# Patient Record
Sex: MALE | Race: OTHER | NOT HISPANIC OR LATINO | URBAN - METROPOLITAN AREA
[De-identification: names, ages, dates, MRNs, and addresses within clinical notes are randomized per-mention and may not be internally consistent; named-entity substitution may affect disease eponyms.]

---

## 2023-05-26 ENCOUNTER — INPATIENT (INPATIENT)
Facility: HOSPITAL | Age: 33
LOS: 2 days | Discharge: HOME | DRG: 871 | End: 2023-05-29
Attending: SPECIALIST | Admitting: SPECIALIST
Payer: COMMERCIAL

## 2023-05-26 VITALS
HEART RATE: 105 BPM | TEMPERATURE: 98 F | OXYGEN SATURATION: 94 % | SYSTOLIC BLOOD PRESSURE: 114 MMHG | DIASTOLIC BLOOD PRESSURE: 81 MMHG | HEIGHT: 67 IN | WEIGHT: 130.95 LBS | RESPIRATION RATE: 20 BRPM

## 2023-05-26 DIAGNOSIS — R06.09 OTHER FORMS OF DYSPNEA: ICD-10-CM

## 2023-05-26 DIAGNOSIS — A41.9 SEPSIS, UNSPECIFIED ORGANISM: ICD-10-CM

## 2023-05-26 DIAGNOSIS — J18.9 PNEUMONIA, UNSPECIFIED ORGANISM: ICD-10-CM

## 2023-05-26 DIAGNOSIS — Z29.9 ENCOUNTER FOR PROPHYLACTIC MEASURES, UNSPECIFIED: ICD-10-CM

## 2023-05-26 DIAGNOSIS — G35 MULTIPLE SCLEROSIS: ICD-10-CM

## 2023-05-26 LAB
ANION GAP SERPL CALC-SCNC: 9 MMOL/L — SIGNIFICANT CHANGE UP (ref 5–17)
APTT BLD: 34.7 SEC — SIGNIFICANT CHANGE UP (ref 27.5–35.5)
BASOPHILS # BLD AUTO: 0.05 K/UL — SIGNIFICANT CHANGE UP (ref 0–0.2)
BASOPHILS NFR BLD AUTO: 0.3 % — SIGNIFICANT CHANGE UP (ref 0–2)
BUN SERPL-MCNC: 8 MG/DL — SIGNIFICANT CHANGE UP (ref 7–23)
CALCIUM SERPL-MCNC: 9 MG/DL — SIGNIFICANT CHANGE UP (ref 8.4–10.5)
CHLORIDE SERPL-SCNC: 101 MMOL/L — SIGNIFICANT CHANGE UP (ref 96–108)
CK MB CFR SERPL CALC: <1 NG/ML — SIGNIFICANT CHANGE UP (ref 0–6.7)
CK SERPL-CCNC: 26 U/L — LOW (ref 30–200)
CO2 SERPL-SCNC: 28 MMOL/L — SIGNIFICANT CHANGE UP (ref 22–31)
CREAT SERPL-MCNC: 0.72 MG/DL — SIGNIFICANT CHANGE UP (ref 0.5–1.3)
EGFR: 122 ML/MIN/1.73M2 — SIGNIFICANT CHANGE UP
EOSINOPHIL # BLD AUTO: 0.31 K/UL — SIGNIFICANT CHANGE UP (ref 0–0.5)
EOSINOPHIL NFR BLD AUTO: 1.7 % — SIGNIFICANT CHANGE UP (ref 0–6)
GLUCOSE SERPL-MCNC: 109 MG/DL — HIGH (ref 70–99)
HCT VFR BLD CALC: 37 % — LOW (ref 39–50)
HGB BLD-MCNC: 11.5 G/DL — LOW (ref 13–17)
IMM GRANULOCYTES NFR BLD AUTO: 0.8 % — SIGNIFICANT CHANGE UP (ref 0–0.9)
INR BLD: 1.23 — HIGH (ref 0.88–1.16)
LACTATE SERPL-SCNC: 1 MMOL/L — SIGNIFICANT CHANGE UP (ref 0.5–2)
LYMPHOCYTES # BLD AUTO: 1.18 K/UL — SIGNIFICANT CHANGE UP (ref 1–3.3)
LYMPHOCYTES # BLD AUTO: 6.5 % — LOW (ref 13–44)
MCHC RBC-ENTMCNC: 25.6 PG — LOW (ref 27–34)
MCHC RBC-ENTMCNC: 31.1 GM/DL — LOW (ref 32–36)
MCV RBC AUTO: 82.2 FL — SIGNIFICANT CHANGE UP (ref 80–100)
MONOCYTES # BLD AUTO: 1.1 K/UL — HIGH (ref 0–0.9)
MONOCYTES NFR BLD AUTO: 6 % — SIGNIFICANT CHANGE UP (ref 2–14)
NEUTROPHILS # BLD AUTO: 15.42 K/UL — HIGH (ref 1.8–7.4)
NEUTROPHILS NFR BLD AUTO: 84.7 % — HIGH (ref 43–77)
NRBC # BLD: 0 /100 WBCS — SIGNIFICANT CHANGE UP (ref 0–0)
PLATELET # BLD AUTO: 573 K/UL — HIGH (ref 150–400)
POTASSIUM SERPL-MCNC: 4.3 MMOL/L — SIGNIFICANT CHANGE UP (ref 3.5–5.3)
POTASSIUM SERPL-SCNC: 4.3 MMOL/L — SIGNIFICANT CHANGE UP (ref 3.5–5.3)
PROTHROM AB SERPL-ACNC: 14.7 SEC — HIGH (ref 10.5–13.4)
RAPID RVP RESULT: SIGNIFICANT CHANGE UP
RBC # BLD: 4.5 M/UL — SIGNIFICANT CHANGE UP (ref 4.2–5.8)
RBC # FLD: 12.5 % — SIGNIFICANT CHANGE UP (ref 10.3–14.5)
SARS-COV-2 RNA SPEC QL NAA+PROBE: SIGNIFICANT CHANGE UP
SODIUM SERPL-SCNC: 138 MMOL/L — SIGNIFICANT CHANGE UP (ref 135–145)
TROPONIN T SERPL-MCNC: 0.01 NG/ML — SIGNIFICANT CHANGE UP (ref 0–0.01)
WBC # BLD: 18.21 K/UL — HIGH (ref 3.8–10.5)
WBC # FLD AUTO: 18.21 K/UL — HIGH (ref 3.8–10.5)

## 2023-05-26 PROCEDURE — 71275 CT ANGIOGRAPHY CHEST: CPT | Mod: 26

## 2023-05-26 PROCEDURE — 99285 EMERGENCY DEPT VISIT HI MDM: CPT

## 2023-05-26 PROCEDURE — 71045 X-RAY EXAM CHEST 1 VIEW: CPT | Mod: 26

## 2023-05-26 RX ORDER — CEFTRIAXONE 500 MG/1
1000 INJECTION, POWDER, FOR SOLUTION INTRAMUSCULAR; INTRAVENOUS EVERY 24 HOURS
Refills: 0 | Status: DISCONTINUED | OUTPATIENT
Start: 2023-05-26 | End: 2023-05-29

## 2023-05-26 RX ORDER — KETOROLAC TROMETHAMINE 30 MG/ML
15 SYRINGE (ML) INJECTION ONCE
Refills: 0 | Status: DISCONTINUED | OUTPATIENT
Start: 2023-05-26 | End: 2023-05-26

## 2023-05-26 RX ORDER — CEFTRIAXONE 500 MG/1
1000 INJECTION, POWDER, FOR SOLUTION INTRAMUSCULAR; INTRAVENOUS ONCE
Refills: 0 | Status: COMPLETED | OUTPATIENT
Start: 2023-05-26 | End: 2023-05-26

## 2023-05-26 RX ORDER — MODAFINIL 200 MG/1
100 TABLET ORAL DAILY
Refills: 0 | Status: DISCONTINUED | OUTPATIENT
Start: 2023-05-27 | End: 2023-05-29

## 2023-05-26 RX ORDER — IPRATROPIUM/ALBUTEROL SULFATE 18-103MCG
3 AEROSOL WITH ADAPTER (GRAM) INHALATION
Refills: 0 | Status: COMPLETED | OUTPATIENT
Start: 2023-05-26 | End: 2023-05-26

## 2023-05-26 RX ORDER — ACETAMINOPHEN 500 MG
650 TABLET ORAL EVERY 6 HOURS
Refills: 0 | Status: DISCONTINUED | OUTPATIENT
Start: 2023-05-26 | End: 2023-05-29

## 2023-05-26 RX ORDER — BACLOFEN 100 %
20 POWDER (GRAM) MISCELLANEOUS EVERY 12 HOURS
Refills: 0 | Status: DISCONTINUED | OUTPATIENT
Start: 2023-05-26 | End: 2023-05-28

## 2023-05-26 RX ORDER — SODIUM CHLORIDE 9 MG/ML
1000 INJECTION INTRAMUSCULAR; INTRAVENOUS; SUBCUTANEOUS ONCE
Refills: 0 | Status: COMPLETED | OUTPATIENT
Start: 2023-05-26 | End: 2023-05-26

## 2023-05-26 RX ORDER — BACLOFEN 100 %
1 POWDER (GRAM) MISCELLANEOUS
Refills: 0 | DISCHARGE

## 2023-05-26 RX ORDER — SODIUM CHLORIDE 9 MG/ML
1000 INJECTION INTRAMUSCULAR; INTRAVENOUS; SUBCUTANEOUS
Refills: 0 | Status: DISCONTINUED | OUTPATIENT
Start: 2023-05-26 | End: 2023-05-27

## 2023-05-26 RX ORDER — AZITHROMYCIN 500 MG/1
500 TABLET, FILM COATED ORAL ONCE
Refills: 0 | Status: COMPLETED | OUTPATIENT
Start: 2023-05-26 | End: 2023-05-26

## 2023-05-26 RX ORDER — DALFAMPRIDINE 10 MG/1
1 TABLET, FILM COATED, EXTENDED RELEASE ORAL
Refills: 0 | DISCHARGE

## 2023-05-26 RX ORDER — AZITHROMYCIN 500 MG/1
500 TABLET, FILM COATED ORAL EVERY 24 HOURS
Refills: 0 | Status: COMPLETED | OUTPATIENT
Start: 2023-05-27 | End: 2023-05-27

## 2023-05-26 RX ORDER — MODAFINIL 200 MG/1
1 TABLET ORAL
Refills: 0 | DISCHARGE

## 2023-05-26 RX ORDER — LANOLIN ALCOHOL/MO/W.PET/CERES
3 CREAM (GRAM) TOPICAL AT BEDTIME
Refills: 0 | Status: DISCONTINUED | OUTPATIENT
Start: 2023-05-26 | End: 2023-05-29

## 2023-05-26 RX ADMIN — AZITHROMYCIN 255 MILLIGRAM(S): 500 TABLET, FILM COATED ORAL at 18:21

## 2023-05-26 RX ADMIN — Medication 15 MILLIGRAM(S): at 15:13

## 2023-05-26 RX ADMIN — Medication 3 MILLILITER(S): at 16:43

## 2023-05-26 RX ADMIN — SODIUM CHLORIDE 1000 MILLILITER(S): 9 INJECTION INTRAMUSCULAR; INTRAVENOUS; SUBCUTANEOUS at 15:43

## 2023-05-26 RX ADMIN — Medication 3 MILLIGRAM(S): at 23:02

## 2023-05-26 RX ADMIN — Medication 3 MILLILITER(S): at 15:43

## 2023-05-26 RX ADMIN — CEFTRIAXONE 100 MILLIGRAM(S): 500 INJECTION, POWDER, FOR SOLUTION INTRAMUSCULAR; INTRAVENOUS at 22:06

## 2023-05-26 RX ADMIN — CEFTRIAXONE 100 MILLIGRAM(S): 500 INJECTION, POWDER, FOR SOLUTION INTRAMUSCULAR; INTRAVENOUS at 16:42

## 2023-05-26 RX ADMIN — Medication 3 MILLILITER(S): at 15:13

## 2023-05-26 RX ADMIN — CEFTRIAXONE 1000 MILLIGRAM(S): 500 INJECTION, POWDER, FOR SOLUTION INTRAMUSCULAR; INTRAVENOUS at 18:20

## 2023-05-26 RX ADMIN — SODIUM CHLORIDE 80 MILLILITER(S): 9 INJECTION INTRAMUSCULAR; INTRAVENOUS; SUBCUTANEOUS at 23:03

## 2023-05-26 NOTE — H&P ADULT - ASSESSMENT
33-year-old male with PMH of MS on Ocrevus, chronic sinusitis (s/p sinus surgery 5 weeks ago) presenting with CC of productive cough, weight loss, and chest pain for 1.5 weeks, admitted for sepsis 2/2 pneumonia vs r/o PE vs TB.

## 2023-05-26 NOTE — H&P ADULT - SOCIAL HISTORY: SUBSTANCE USE
States he used to smoke marijuana but has not smoked in 7 weeks. Pt has been using adviar prescribed to him by his ENT. States he used to smoke marijuana but has not smoked in 7 weeks.

## 2023-05-26 NOTE — H&P ADULT - PROBLEM SELECTOR PLAN 3
CXR: Right upper and possibly lateral segment right middle lobe pneumonia with ipsilateral hilar lymphadenopathy. Atypical etiologies such as primary TB cannot be excluded in the appropriate clinical setting. CXR: Right upper and possibly lateral segment right middle lobe pneumonia with ipsilateral hilar lymphadenopathy. Atypical etiologies such as primary TB cannot be excluded in the appropriate clinical setting.  - Cover with abx as above  - f/u ID recs  - If decompensating consider fungal and atypical etiologies

## 2023-05-26 NOTE — ED PROVIDER NOTE - CLINICAL SUMMARY MEDICAL DECISION MAKING FREE TEXT BOX
33-year-old male with past medical history of MS on Ocrevus,  chronic sinusitis  status post sinus surgery 5 weeks ago complaining of productive cough, chest pain and back pain x1.5 weeks.  Associated with shortness of breath.  Patient states a few days after his surgery he started feeling fever, sweats, weight loss, headache, generalized weakness. Seen at pulmonology today and desatted to 93% with . Currently pt tachy to 105, O2 sat 94%. Speaking in full sentences. Lungs without wheezing. EKG sinus tachy @ 104. 33-year-old male with past medical history of MS on Ocrevus,  chronic sinusitis  status post sinus surgery 5 weeks ago complaining of productive cough, chest pain and back pain x1.5 weeks.  Associated with shortness of breath.  Patient states a few days after his surgery he started feeling fever, sweats, weight loss, headache, generalized weakness. Seen at pulmonology today and desatted to 93% with . Currently pt tachy to 105, O2 sat 94%. Speaking in full sentences. Lungs without wheezing. EKG sinus tachy @ 104. r/o pna r/o pe r/o TB

## 2023-05-26 NOTE — H&P ADULT - PROBLEM SELECTOR PLAN 1
Patient meeting 2/4 sepsis criteria on admission source likely pneumonia seen on CXR.   - f/u BCx  - Patient meeting 2/4 sepsis criteria on admission source likely pneumonia seen on CXR. s/p 1L NS in ED.   - f/u BCx  - CTX 2g for HAP x 7 days and azithromycin 500mg x 3 days.  - Fluconazole  - ID consulted f/u recs. Patient meeting 2/4 sepsis criteria on admission source likely pneumonia seen on CXR. s/p 1L NS in ED.   - f/u BCx  - CTX 2g for HAP x 7 days and azithromycin 500mg x 3 days.  - f/u legionella, mycoplasma  - ID consulted f/u recs.  - c/w IV hydration NS @80cc/hr  - Trend white count

## 2023-05-26 NOTE — H&P ADULT - HISTORY OF PRESENT ILLNESS
33-year-old male with PMH of MS on Ocrevus,  chronic sinusitis (s/p sinus surgery 5 weeks ago) prsenting with CC of productive cough and chest pain for 1.5 weeks. After the surgery, he started feeling fever, sweats, weight loss, headache, and generalized weakness x 2 weeks as well as a weight loss of 20lbs. He went to his PCP ___ who said it was likely flu. Since then, the weakness and fatigue had improved, but as of 1.5 weeks ago he started having insidious onset back and chest pain as well as dyspnea and productive cough. He went to see Dr. Fonseca today, where in the office he desaturated to 93% with  while ambulating. ICS was 40% of predicted and he had poor air movement.     ROS: Pt denies fever, chills, hemoptysis, palpitations, headache, dizziness, lower extremity swelling, recent travel.      ED Course: T 97.7  /81 RR20 Sat 94% on RA.  Labs: WBC 18.2 Platelets 573   RVP -, COVID -  CXR: Right upper and possibly lateral segment right middle lobe pneumonia with ipsilateral hilar lymphadenopathy. Atypical etiologies such as primary TB cannot be excluded in the appropriate clinical setting.   33-year-old male with PMH of MS on Ocrevus,  chronic sinusitis (s/p sinus surgery 5 weeks ago) prsenting with CC of productive cough and chest pain for 1.5 weeks. After the surgery, he started feeling fever, sweats, weight loss, headache, and generalized weakness x 2 weeks as well as a weight loss of 20lbs. He went to his PCP  who said it was likely flu. Since then, the weakness and fatigue had improved, but as of 1.5 weeks ago he started having insidious onset back and chest pain as well as dyspnea and productive cough. He went to see Dr. Fonseca today, where in the office he desaturated to 93% with  while ambulating. ICS was 40% of predicted and he had poor air movement.     ROS: Pt denies fever, chills, hemoptysis, palpitations, headache, dizziness, lower extremity swelling, recent travel.      ED Course: T 97.7  /81 RR20 Sat 94% on RA.  Labs: WBC 18.2 Platelets 573   RVP -, COVID -  CXR: Right upper and possibly lateral segment right middle lobe pneumonia with ipsilateral hilar lymphadenopathy. Atypical etiologies such as primary TB cannot be excluded in the appropriate clinical setting.    Interventions: 1LNS, 1x azithromycin, toradol.

## 2023-05-26 NOTE — H&P ADULT - PROBLEM SELECTOR PLAN 4
Home medication: Ocrevus Home medication:   - c/w ampyra (gf will bring in verify with pharmacy)  - baclofen 20mg BID  - c/w biotin 13213gd qd  - c/w vitamin D3 [id number] (next dose 5/28)  - modinifil 10mg BID PRN.

## 2023-05-26 NOTE — ED PROVIDER NOTE - PHYSICAL EXAMINATION
CONSTITUTIONAL: Well-appearing;  in no apparent distress.   HEAD: Normocephalic; atraumatic.   EYES: PERRL; EOM intact; conjunctiva and sclera clear  ENT: normal nose; no rhinorrhea; normal pharynx with no erythema or lesions.   NECK: Supple; non-tender; no LAD  CARDIOVASCULAR: Normal S1, S2; No audible murmurs. Regular rate and rhythm.   RESPIRATORY: cta b/l   GI: Soft; non-distended; non-tender; no palpable organomegaly.   MSK: FROM at all extremities, normal tone   EXT: No cyanosis or edema; N/V intact  SKIN: Normal for age and race; warm; dry; good turgor; no apparent lesions or rash.   NEURO: A & O x 3; face symmetric; grossly unremarkable.   PSYCHOLOGICAL: The patient’s mood and manner are appropriate.

## 2023-05-26 NOTE — ED ADULT TRIAGE NOTE - CHIEF COMPLAINT QUOTE
32 y/o male sent by PMD for evaluation of SOB for 5 weeks, cough, low oxygen saturation, night sweats and 20 Lb weight loss. Pt with hx of MS and recent sinus surgery.

## 2023-05-26 NOTE — H&P ADULT - NSHPLABSRESULTS_GEN_ALL_CORE
LABS:                         11.5   18.21 )-----------( 573      ( 26 May 2023 14:47 )             37.0     05-26    138  |  101  |  8   ----------------------------<  109<H>  4.3   |  28  |  0.72    Ca    9.0      26 May 2023 14:47      PT/INR - ( 26 May 2023 14:47 )   PT: 14.7 sec;   INR: 1.23          PTT - ( 26 May 2023 14:47 )  PTT:34.7 sec    CARDIAC MARKERS ( 26 May 2023 14:47 )  x     / 0.01 ng/mL / 26 U/L / x     / <1.0 ng/mL        Lactate, Blood: 1.0 mmol/L (05-26 @ 15:55)      RADIOLOGY, EKG & ADDITIONAL TESTS:

## 2023-05-26 NOTE — H&P ADULT - NSHPPHYSICALEXAM_GEN_ALL_CORE
PHYSICAL EXAM:  Constitutional: NAD, comfortable in bed.  HEENT: NC/AT, PERRLA, EOMI, no conjunctival pallor or scleral icterus, MMM  Neck: Supple, no JVD  Respiratory: CTA B/L. No w/r/r.   Cardiovascular: RRR, normal S1 and S2, no m/r/g.   Gastrointestinal: +BS, soft NTND, no guarding or rebound tenderness, no palpable masses   Extremities: wwp; no cyanosis, clubbing or edema.   Vascular: Pulses equal and strong throughout.   Neurological: AAOx3, no CN deficits, strength and sensation intact throughout.   Skin: No gross skin abnormalities or rashes

## 2023-05-26 NOTE — H&P ADULT - PROBLEM SELECTOR PLAN 2
Tachycardic to 105 on admission CXR showing middle lobe pna. Ddx including CAP vs pneumonia, PE, vs less likely TB as no hemoptysis.  - CTAP  with PE protocol, f/u results  - Quantiferon  - f/u Acid fast sputum  - f/u cx as above Tachycardic to 105 on admission CXR showing middle lobe pna. Ddx including CAP vs pneumonia, PE, vs less likely TB as no hemoptysis.  - CTAP  with PE protocol, f/u results  - If   - f/u Acid fast sputum  - f/u cx as above Tachycardic to 105 on admission CXR showing middle lobe pna. Ddx including CAP vs pneumonia, PE, vs less likely TB as no hemoptysis. EKG Sinus tachy, Trops -.   - CTAP  with PE protocol, f/u results  - f/u Acid fast sputum  - f/u cx as above

## 2023-05-26 NOTE — PATIENT PROFILE ADULT - FALL HARM RISK - UNIVERSAL INTERVENTIONS
Bed in lowest position, wheels locked, appropriate side rails in place/Call bell, personal items and telephone in reach/Instruct patient to call for assistance before getting out of bed or chair/Non-slip footwear when patient is out of bed/[place] to call system/Physically safe environment - no spills, clutter or unnecessary equipment/Purposeful Proactive Rounding/Room/bathroom lighting operational, light cord in reach

## 2023-05-26 NOTE — ED PROVIDER NOTE - ATTENDING APP SHARED VISIT CONTRIBUTION OF CARE
33-year-old male with past medical history of MS on Ocrevus,  chronic sinusitis  status post sinus surgery 5 weeks ago complaining of productive cough, chest pain and back pain x1.5 weeks.  Associated with shortness of breath.  Patient states a few days after his surgery he started feeling fever, sweats, weight loss, headache, generalized weakness.  Spoke with his doctor who said he was likely the flu.  During that time he lost 20 pounds in 2 weeks.  Since then the weakness in the fatigue improved by a week and a half ago he started feeling chest pain, cough, back pain and shortness of breath.  This sent to Dr. Fonseca today who referred him to the ER.  As per Dr. Fonseca patient desatted to 93% while walking and his heart rate went to 120.  Incentive spirometry was 40% of predicted and pt was not moving air well.  Patient currently denies fever, chills, hemoptysis, palpitations, headache, dizziness, lower extremity swelling, recent travel.  Denies smoking.  States he used to smoke marijuana but has not smoked in 7 weeks. Pt has been using adviar prescribed to him by his ENT.    desatted to 93% with . Currently pt tachy to 105, O2 sat 94%. Speaking in full sentences. Lungs without wheezing. EKG sinus tachy @ 104. r/o pna r/o pe r/o TB    Agree with above note as documented by PA.  I was available as the supervising attending during patient's ER evaluation.    Pt with fever, cough, weight loss, night sweats, ct with upper lobe pna - pt admitted for further mgmt of pna and r/o TB.

## 2023-05-26 NOTE — ED ADULT NURSE NOTE - OBJECTIVE STATEMENT
32 y/o male sent to ED by PCP c/o SOB, night sweats 34 y/o male sent to ED by PCP c/o SOB, cough night sweats, chest pressure. pt stated " he was measuring his oxygen while walking and went down from 100% to 90 % and because his hx of MS and recent sinus surgery his doctor was concerned" pt also reported loosing about 20 LB after the surgery.

## 2023-05-26 NOTE — ED PROVIDER NOTE - PROGRESS NOTE DETAILS
+multifocal pna with hilar LAD, cannot r/o TB. CT pending. Dr. Fonseca and Dr. Quan made aware of results

## 2023-05-26 NOTE — H&P ADULT - PROBLEM SELECTOR PLAN 5
F: None   E: Replete as necessary K>4 Mg>2  N: Full diet   DVT Prophylaxis: Lovenox 40mg daily   GI prophylaxis: None   CODE STATUS: FULL  DISPO: [facility] F: None   E: Replete as necessary K>4 Mg>2  N: Full diet   DVT Prophylaxis: None   GI prophylaxis: None   CODE STATUS: FULL  DISPO: [facility]

## 2023-05-26 NOTE — ED ADULT NURSE NOTE - CHIEF COMPLAINT QUOTE
34 y/o male sent by PMD for evaluation of SOB for 5 weeks, cough, low oxygen saturation, night sweats and 20 Lb weight loss. Pt with hx of MS and recent sinus surgery.

## 2023-05-26 NOTE — ED PROVIDER NOTE - OBJECTIVE STATEMENT
33-year-old male with past medical history of MS on Ocrevus,  chronic sinusitis  status post sinus surgery 5 weeks ago complaining of productive cough, chest pain and back pain x1.5 weeks.  Associated with shortness of breath.  Patient states a few days after his surgery he started feeling fever, sweats, weight loss, headache, generalized weakness.  Spoke with his doctor who said he was likely the flu.  During that time he lost 20 pounds in 2 weeks.  Since then the weakness in the fatigue improved by a week and a half ago he started feeling chest pain, cough, back pain and shortness of breath.  This sent to Dr. Fonseca today who referred him to the ER.  As per Dr. Fonseca patient desatted to 93% while walking and his heart rate went to 120.  Incentive spirometry was 40% of predicted and pt was not moving air well.  Patient currently denies fever, chills, hemoptysis, palpitations, headache, dizziness, lower extremity swelling, recent travel.  Denies smoking.  States he used to smoke marijuana but has not smoked in 7 weeks. Pt has been using adviar prescribed to him by his ENT.

## 2023-05-26 NOTE — ED ADULT NURSE NOTE - NSFALLUNIVINTERV_ED_ALL_ED
Bed/Stretcher in lowest position, wheels locked, appropriate side rails in place/Call bell, personal items and telephone in reach/Instruct patient to call for assistance before getting out of bed/chair/stretcher/Non-slip footwear applied when patient is off stretcher/[place] to call system/Physically safe environment - no spills, clutter or unnecessary equipment/Purposeful proactive rounding/Room/bathroom lighting operational, light cord in reach

## 2023-05-26 NOTE — PROGRESS NOTE ADULT - SUBJECTIVE AND OBJECTIVE BOX
33-year-old male with past medical history of MS on Ocrevus,  chronic sinusitis  status post sinus surgery 5 weeks ago complaining of productive cough, chest pain and back pain x1.5 weeks.  Referred  to Dr. Fonseca today who referred him to the ER due to desaturation of O2 on exertion and spirometry was 40% of predicted and pt was not moving air well.  Patient currently denies fever, chills, hemoptysis, palpitations, headache, dizziness, lower extremity swelling, recent travel.  Denies smoking.  Chest Xray in ED with infiltrate and wbc >18K    REVIEW OF SYSTEMS:  Constitutional: No fever, +weight loss and fatigue  ENMT:  No difficulty hearing, tinnitus, vertigo; No sinus or throat pain  Respiratory: + cough, +chills no hemoptysis  Cardiovascular: No chest pain, palpitations, dizziness or leg swelling  Gastrointestinal: No abdominal or epigastric pain. No nausea, vomiting; No diarrhea  Skin: No itching, burning, rashes or lesions   Musculoskeletal: No joint pain or swelling; No muscle, back or extremity pain    PAST MEDICAL & SURGICAL HISTORY:  Multiple sclerosis          FAMILY HISTORY:      SOCIAL HISTORY:  Smoking Status: [ ] Current, [ ] Former, [ ] Never  Pack Years:    MEDICATIONS:  MEDICATIONS  (STANDING):  azithromycin  IVPB 500 milliGRAM(s) IV Intermittent once  sodium chloride 0.9% Bolus 1000 milliLiter(s) IV Bolus once    MEDICATIONS  (PRN):      Allergies    No Known Allergies    Intolerances        Vital Signs Last 24 Hrs  T(C): 36.5 (26 May 2023 14:27), Max: 36.5 (26 May 2023 14:27)  T(F): 97.7 (26 May 2023 14:27), Max: 97.7 (26 May 2023 14:27)  HR: 105 (26 May 2023 14:27) (105 - 105)  BP: 114/81 (26 May 2023 14:27) (114/81 - 114/81)  BP(mean): --  RR: 20 (26 May 2023 14:27) (20 - 20)  SpO2: 94% (26 May 2023 14:27) (94% - 94%)    Parameters below as of 26 May 2023 14:27  Patient On (Oxygen Delivery Method): room air            PHYSICAL EXAM:    General: ; in no acute distress, thin and poorly nourished, dehydrated  HEENT: dry MM, conjunctiva and sclera clear  Lungs: no rales on current exam but likely due to dryness likely will hear crackling once hydrated  Heart: regular  Gastrointestinal: Soft, non-tender non-distended; Normal bowel sounds; No rebound or guarding  Extremities: Normal range of motion, No clubbing, cyanosis or edema  Neurological: Alert and oriented x3  Skin: Warm and dry. No obvious rash      LABS:                        11.5   18.21 )-----------( 573      ( 26 May 2023 14:47 )             37.0     05-26    138  |  101  |  8   ----------------------------<  109<H>  4.3   |  28  |  0.72    Ca    9.0      26 May 2023 14:47            RADIOLOGY & ADDITIONAL STUDIES:

## 2023-05-27 LAB
ALBUMIN SERPL ELPH-MCNC: 2.8 G/DL — LOW (ref 3.3–5)
ALP SERPL-CCNC: 114 U/L — SIGNIFICANT CHANGE UP (ref 40–120)
ALT FLD-CCNC: 44 U/L — SIGNIFICANT CHANGE UP (ref 10–45)
ANION GAP SERPL CALC-SCNC: 8 MMOL/L — SIGNIFICANT CHANGE UP (ref 5–17)
AST SERPL-CCNC: 17 U/L — SIGNIFICANT CHANGE UP (ref 10–40)
BASOPHILS # BLD AUTO: 0.05 K/UL — SIGNIFICANT CHANGE UP (ref 0–0.2)
BASOPHILS NFR BLD AUTO: 0.3 % — SIGNIFICANT CHANGE UP (ref 0–2)
BILIRUB SERPL-MCNC: 0.2 MG/DL — SIGNIFICANT CHANGE UP (ref 0.2–1.2)
BUN SERPL-MCNC: 6 MG/DL — LOW (ref 7–23)
CALCIUM SERPL-MCNC: 8.9 MG/DL — SIGNIFICANT CHANGE UP (ref 8.4–10.5)
CHLORIDE SERPL-SCNC: 106 MMOL/L — SIGNIFICANT CHANGE UP (ref 96–108)
CO2 SERPL-SCNC: 24 MMOL/L — SIGNIFICANT CHANGE UP (ref 22–31)
CREAT SERPL-MCNC: 0.72 MG/DL — SIGNIFICANT CHANGE UP (ref 0.5–1.3)
EGFR: 122 ML/MIN/1.73M2 — SIGNIFICANT CHANGE UP
EOSINOPHIL # BLD AUTO: 0.5 K/UL — SIGNIFICANT CHANGE UP (ref 0–0.5)
EOSINOPHIL NFR BLD AUTO: 3.4 % — SIGNIFICANT CHANGE UP (ref 0–6)
GLUCOSE SERPL-MCNC: 96 MG/DL — SIGNIFICANT CHANGE UP (ref 70–99)
HCT VFR BLD CALC: 33.2 % — LOW (ref 39–50)
HGB BLD-MCNC: 10.3 G/DL — LOW (ref 13–17)
IMM GRANULOCYTES NFR BLD AUTO: 1.4 % — HIGH (ref 0–0.9)
LYMPHOCYTES # BLD AUTO: 1.58 K/UL — SIGNIFICANT CHANGE UP (ref 1–3.3)
LYMPHOCYTES # BLD AUTO: 10.9 % — LOW (ref 13–44)
MAGNESIUM SERPL-MCNC: 2.1 MG/DL — SIGNIFICANT CHANGE UP (ref 1.6–2.6)
MCHC RBC-ENTMCNC: 25.7 PG — LOW (ref 27–34)
MCHC RBC-ENTMCNC: 31 GM/DL — LOW (ref 32–36)
MCV RBC AUTO: 82.8 FL — SIGNIFICANT CHANGE UP (ref 80–100)
MONOCYTES # BLD AUTO: 1.08 K/UL — HIGH (ref 0–0.9)
MONOCYTES NFR BLD AUTO: 7.4 % — SIGNIFICANT CHANGE UP (ref 2–14)
NEUTROPHILS # BLD AUTO: 11.13 K/UL — HIGH (ref 1.8–7.4)
NEUTROPHILS NFR BLD AUTO: 76.6 % — SIGNIFICANT CHANGE UP (ref 43–77)
NIGHT BLUE STAIN TISS: SIGNIFICANT CHANGE UP
NRBC # BLD: 0 /100 WBCS — SIGNIFICANT CHANGE UP (ref 0–0)
PHOSPHATE SERPL-MCNC: 4 MG/DL — SIGNIFICANT CHANGE UP (ref 2.5–4.5)
PLATELET # BLD AUTO: 501 K/UL — HIGH (ref 150–400)
POTASSIUM SERPL-MCNC: 4.4 MMOL/L — SIGNIFICANT CHANGE UP (ref 3.5–5.3)
POTASSIUM SERPL-SCNC: 4.4 MMOL/L — SIGNIFICANT CHANGE UP (ref 3.5–5.3)
PROCALCITONIN SERPL-MCNC: 0.1 NG/ML — SIGNIFICANT CHANGE UP (ref 0.02–0.1)
PROT SERPL-MCNC: 5.2 G/DL — LOW (ref 6–8.3)
RBC # BLD: 4.01 M/UL — LOW (ref 4.2–5.8)
RBC # FLD: 12.7 % — SIGNIFICANT CHANGE UP (ref 10.3–14.5)
SODIUM SERPL-SCNC: 138 MMOL/L — SIGNIFICANT CHANGE UP (ref 135–145)
SPECIMEN SOURCE: SIGNIFICANT CHANGE UP
WBC # BLD: 14.55 K/UL — HIGH (ref 3.8–10.5)
WBC # FLD AUTO: 14.55 K/UL — HIGH (ref 3.8–10.5)

## 2023-05-27 RX ORDER — ERGOCALCIFEROL 1.25 MG/1
50000 CAPSULE ORAL
Refills: 0 | Status: DISCONTINUED | OUTPATIENT
Start: 2023-05-27 | End: 2023-05-27

## 2023-05-27 RX ORDER — DALFAMPRIDINE 10 MG/1
10 TABLET, FILM COATED, EXTENDED RELEASE ORAL EVERY 12 HOURS
Refills: 0 | Status: DISCONTINUED | OUTPATIENT
Start: 2023-05-27 | End: 2023-05-29

## 2023-05-27 RX ORDER — ERGOCALCIFEROL 1.25 MG/1
50000 CAPSULE ORAL
Refills: 0 | Status: DISCONTINUED | OUTPATIENT
Start: 2023-05-27 | End: 2023-05-29

## 2023-05-27 RX ADMIN — AZITHROMYCIN 500 MILLIGRAM(S): 500 TABLET, FILM COATED ORAL at 00:15

## 2023-05-27 RX ADMIN — Medication 20 MILLIGRAM(S): at 20:46

## 2023-05-27 RX ADMIN — CEFTRIAXONE 100 MILLIGRAM(S): 500 INJECTION, POWDER, FOR SOLUTION INTRAMUSCULAR; INTRAVENOUS at 22:22

## 2023-05-27 RX ADMIN — AZITHROMYCIN 500 MILLIGRAM(S): 500 TABLET, FILM COATED ORAL at 23:30

## 2023-05-27 RX ADMIN — DALFAMPRIDINE 10 MILLIGRAM(S): 10 TABLET, FILM COATED, EXTENDED RELEASE ORAL at 20:47

## 2023-05-27 NOTE — DIETITIAN INITIAL EVALUATION ADULT - PERTINENT MEDS FT
MEDICATIONS  (STANDING):  azithromycin   Tablet 500 milliGRAM(s) Oral every 24 hours  cefTRIAXone   IVPB 1000 milliGRAM(s) IV Intermittent every 24 hours  sodium chloride 0.9%. 1000 milliLiter(s) (80 mL/Hr) IV Continuous <Continuous>    MEDICATIONS  (PRN):  acetaminophen     Tablet .. 650 milliGRAM(s) Oral every 6 hours PRN Temp greater or equal to 38C (100.4F), Mild Pain (1 - 3)  baclofen 20 milliGRAM(s) Oral every 12 hours PRN Muscle Spasm  melatonin 3 milliGRAM(s) Oral at bedtime PRN Insomnia  modafinil 100 milliGRAM(s) Oral daily PRN for anxiety

## 2023-05-27 NOTE — DIETITIAN INITIAL EVALUATION ADULT - OTHER INFO
33-year-old male with PMH of MS on Ocrevus,  chronic sinusitis (s/p sinus surgery 5 weeks ago) prsenting with CC of productive cough and chest pain for 1.5 weeks. After the surgery, he started feeling fever, sweats, weight loss, headache, and generalized weakness x 2 weeks as well as a weight loss of 20lbs. He went to his PCP  who said it was likely flu. Since then, the weakness and fatigue had improved, but as of 1.5 weeks ago he started having insidious onset back and chest pain as well as dyspnea and productive cough. He went to see Dr. Fonseca today, where in the office he desaturated to 93% with  while ambulating. ICS was 40% of predicted and he had poor air movement.     Patient seen for MST assessment. Current diet order: regular. NKFA. Pt reports he follows Kosher diet at home, however will order vegetarian food in the hospital. No difficulty chewing/swallowing reported. Reports good appetite now, however was experiencing weakness, sweats, and suspected dehydration affecting appetite PTA. Pt consumed 75 % of breakfast which included avocado toast, potatoes, hard boiled eggs, juice. Provided encouragement for PO intake and discussed benefits of ONS for preventing further weight loss, pt agreeable to KienVe. Dosing weight: 125 pounds, BMI 19.6, reports UBW of 142 pounds x3 months ago. 12% weight loss x3 months, clinically significant. No pressure injuries documented. No edema documented. Denies N/V/D, reports no BM x2 days but normally has BM 1x/day. BUN low. Meds: abx. Based on ASPEN guidelines, pt meets criteria for severe malnutrition. See nutrition recommendations below.

## 2023-05-27 NOTE — DIETITIAN INITIAL EVALUATION ADULT - PROBLEM SELECTOR PLAN 1
Patient meeting 2/4 sepsis criteria on admission source likely pneumonia seen on CXR. s/p 1L NS in ED.   - f/u BCx  - CTX 2g for HAP x 7 days and azithromycin 500mg x 3 days.  - f/u legionella, mycoplasma  - ID consulted f/u recs.  - c/w IV hydration NS @80cc/hr  - Trend white count

## 2023-05-27 NOTE — CONSULT NOTE ADULT - SUBJECTIVE AND OBJECTIVE BOX
HPI:  33-year-old male with PMH of MS on Ocrevus,  chronic sinusitis (s/p sinus surgery 5 weeks ago) prsenting with CC of productive cough and chest pain for 1.5 weeks. After the surgery, he started feeling fever, sweats, weight loss, headache, and generalized weakness x 2 weeks as well as a weight loss of 20lbs. He went to his PCP  who said it was likely flu. Since then, the weakness and fatigue had improved, but as of 1.5 weeks ago he started having insidious onset back and chest pain as well as dyspnea and productive cough. He went to see Dr. Fonseca today, where in the office he desaturated to 93% with  while ambulating. ICS was 40% of predicted and he had poor air movement.     ROS: Pt denies fever, chills, hemoptysis, palpitations, headache, dizziness, lower extremity swelling, recent travel.      ED Course: T 97.7  /81 RR20 Sat 94% on RA.  Labs: WBC 18.2 Platelets 573   RVP -, COVID -  CXR: Right upper and possibly lateral segment right middle lobe pneumonia with ipsilateral hilar lymphadenopathy. Atypical etiologies such as primary TB cannot be excluded in the appropriate clinical setting.    Interventions: 1LNS, 1x azithromycin, toradol.   (26 May 2023 17:53)      PAST MEDICAL & SURGICAL HISTORY:  Multiple sclerosis      REVIEW OF SYSTEMS:    General:(+) weakness; (+) fevers, now resolved, no chills  Skin/Breast: no rash  Respiratory and Thorax: improved  SOB, (+) cough  Cardiovascular:	(+) Pleuritic chest pain  Gastrointestinal:	 no nausea, vomiting , diarrhea  Genitourinary:	no dysuria, no difficulty urinating, no hematuria  Musculoskeletal:	no weakness, no joint swelling/pain  Neurological: no focal weakness/numbness  Endocrine: no polyuria, no polydipsia      ANTIBIOTICS:  MEDICATIONS  (STANDING):  azithromycin   Tablet 500 milliGRAM(s) Oral every 24 hours  Biotin 10,000 mcg tablet 1 Tablet(s) 1 Capsule(s) Oral daily  cefTRIAXone   IVPB 1000 milliGRAM(s) IV Intermittent every 24 hours  dalfampridine ER 10 milliGRAM(s) Oral every 12 hours  ergocalciferol 79405 Unit(s) Oral <User Schedule>  Gentamicin 80mg/500ml 1 Application(s)   Nasal two times a day    MEDICATIONS  (PRN):  acetaminophen Tablet 650 milliGRAM(s) Oral every 6 hours PRN Temp greater or equal to 38C (100.4F), Mild Pain (1 - 3)  baclofen 20 milliGRAM(s) Oral every 12 hours PRN Muscle Spasm  melatonin 3 milliGRAM(s) Oral at bedtime PRN Insomnia  modafinil 100 milliGRAM(s) Oral daily PRN for anxiety      Allergies: No Known Allergies    SOCIAL HISTORY: no smoking, no ETOH, no exposure to sick people    FAMILY HISTORY:n/c      Vital Signs Last 24 Hrs  T(C): 36.7 (27 May 2023 13:55), Max: 36.8 (27 May 2023 06:30)  T(F): 98.1 (27 May 2023 13:55), Max: 98.2 (27 May 2023 06:30)  HR: 94 (27 May 2023 13:55) (92 - 94)  BP: 110/62 (27 May 2023 13:55) (108/67 - 110/62)  BP(mean): --  RR: 18 (27 May 2023 13:55) (16 - 18)  SpO2: 96% (27 May 2023 13:55) (96% - 96%)    Parameters below as of 27 May 2023 13:55  Patient On (Oxygen Delivery Method): room air      PHYSICAL EXAM:  Constitutional: Well-developed, well nourished  Eyes:NAM, EOMI  Ear/Nose/Throat: no oral lesion, no sinus tenderness on percussion	  Neck:no JVD, no lymphadenopathy, supple  Respiratory:  RUL with decreased breath sounds. No w/r/r and no crackles.   Cardiovascular: S1S2 RRR, no murmurs  Gastrointestinal:soft, (+) BS, no HSM  Extremities:no e/e/c  Vascular: DP Pulse: right normal; left normal      LABS:                        10.3   14.55 )-----------( 501      ( 27 May 2023 07:04 )             33.2     05-27    138  |  106  |  6<L>  ----------------------------<  96  4.4   |  24  |  0.72    Ca    8.9      27 May 2023 07:04  Phos  4.0     05-27  Mg     2.1     05-27    TPro  5.2<L>  /  Alb  2.8<L>  /  TBili  0.2  /  DBili  x   /  AST  17  /  ALT  44  /  AlkPhos  114  05-27    PT/INR - ( 26 May 2023 14:47 )   PT: 14.7 sec;   INR: 1.23          PTT - ( 26 May 2023 14:47 )  PTT:34.7 sec    Procalcitonin, Serum (05.27.23 @ 07:04)    Procalcitonin, Serum: 0.10: Procalcitonin (PCT) Interpretation (ng/mL) - Diagnosis of systemic  bacterial infection/sepsis  PCT < 0.5: Systemic infection (sepsis) is not likely    MICROBIOLOGY:  Culture - Acid Fast - Sputum w/Smear . (05.26.23 @ 18:32)    Specimen Source: .Sputum Sputum   Acid Fast Bacilli Smear:   No acid-fast bacilli seen by fluorochrome stain    Culture - Blood (05.26.23 @ 16:19)    Specimen Source: .Blood Blood-Peripheral   Culture Results:   No growth at 1 day.      RADIOLOGY & ADDITIONAL STUDIES:    ACC: [id number] [facility]   ORDERED BY: MARZENA LINN     PROCEDURE DATE:  05/26/2023          INTERPRETATION:  CLINICAL INFORMATION:    COMPARISON: None.    CONTRAST/COMPLICATIONS:  IV Contrast: Isovue 370  70 cc administered   30 cc discarded  Oral Contrast: NONE  Complications: None reported at time of study completion    PROCEDURE:  CT Angiogram of the chest was obtained with intravenous contrast. Three   dimensional maximum intensity projection (MIP) images were generated.    FINDINGS:    PULMONARY ANGIOGRAM: No pulmonary artery embolism. Main pulmonary artery   is of normal caliber.    LYMPH NODES: No bulky mediastinal lymphadenopathy. Limited evaluation of   the right hilum due to perihilar consolidation. There may be increased   number of nonenlarged subcarinal lymph nodes.    HEART/VASCULATURE: Heart size and pericardium are within normal limits.    AIRWAYS/LUNGS/PLEURA: Central airways are patent. There is right upper   lobe and superior segment to the right lower lobe consolidation with   tree-in-bud micronodular opacities and more patchy consolidation   involving the lateral segment and anterior basal segment of the right   middle and right lower lobes. Abbreviated differential includes   multifocal pneumonia and/or aspiration. There may be a 6 mm effusion node   in the lateral basal segment of the right lower lobe (11:231). There is   some unilateral interlobular septal thickening involving predominantly   the right lung which may representa superimposed asymmetric pulmonary   edema pattern. There is a trace right-sided pleural effusion.    UPPER ABDOMEN: Unremarkable.    BONES/SOFT TISSUES: No aggressive osseous lesions.    IMPRESSION:      1. No pulmonary artery embolism.  2. Multifocal pneumonia involving the right upper and right lower lobes   predominantly. Central airways are patent. Abbreviated differential   includes aspiration pneumonia. Interval surveillance to confirm   resolution is suggested.  3. Superimposed mild interlobular septal thickening involving the right   hemithorax which may represent an asymmetric pulmonary edema pattern.

## 2023-05-27 NOTE — CONSULT NOTE ADULT - ASSESSMENT
33-year-old male with past medical history of MS on Ocrevus,  chronic sinusitis  status post sinus surgery 5 weeks ago complaining of productive cough, chest pain and back pain x1.5 weeks and shortness of breath, CT Angio c/w Multifocal pneumonia, admitted for IV antibiotics for Pneumonia/Sepsis and to r/o TB

## 2023-05-27 NOTE — PROGRESS NOTE ADULT - SUBJECTIVE AND OBJECTIVE BOX
Pt seen and examined   looks better  he says he feels better as well    REVIEW OF SYSTEMS:  Constitutional: No fever,   Cardiovascular: No chest pain, palpitations, dizziness  Resp: less cough, denies sob, chest pain sl less  Gastrointestinal: No abdominal or epigastric pain. No nausea, vomiting No diarrhea  Skin: No itching, burning, rashes or lesions       MEDICATIONS:  MEDICATIONS  (STANDING):  azithromycin   Tablet 500 milliGRAM(s) Oral every 24 hours  cefTRIAXone   IVPB 1000 milliGRAM(s) IV Intermittent every 24 hours  sodium chloride 0.9%. 1000 milliLiter(s) (80 mL/Hr) IV Continuous <Continuous>    MEDICATIONS  (PRN):  acetaminophen     Tablet .. 650 milliGRAM(s) Oral every 6 hours PRN Temp greater or equal to 38C (100.4F), Mild Pain (1 - 3)  baclofen 20 milliGRAM(s) Oral every 12 hours PRN Muscle Spasm  melatonin 3 milliGRAM(s) Oral at bedtime PRN Insomnia  modafinil 100 milliGRAM(s) Oral daily PRN for anxiety      Allergies    No Known Allergies    Intolerances        Vital Signs Last 24 Hrs  T(C): 36.8 (27 May 2023 06:30), Max: 36.8 (26 May 2023 18:58)  T(F): 98.2 (27 May 2023 06:30), Max: 98.2 (26 May 2023 18:58)  HR: 92 (27 May 2023 06:30) (83 - 105)  BP: 108/67 (27 May 2023 06:30) (104/70 - 114/81)  BP(mean): --  RR: 16 (27 May 2023 06:30) (16 - 20)  SpO2: 96% (27 May 2023 06:30) (94% - 96%)    Parameters below as of 27 May 2023 06:30  Patient On (Oxygen Delivery Method): room air          PHYSICAL EXAM:    General: thin; in no acute distress  HEENT: MMM, conjunctiva and sclera clear  Lungs: crackles RUL and RLL left clear  Heart: regular  Gastrointestinal: Soft non-tender non-distended; Normal bowel sounds;   Skin: Warm and dry. No obvious rash    LABS:      CBC Full  -  ( 27 May 2023 07:04 )  WBC Count : 14.55 K/uL  RBC Count : 4.01 M/uL  Hemoglobin : 10.3 g/dL  Hematocrit : 33.2 %  Platelet Count - Automated : 501 K/uL  Mean Cell Volume : 82.8 fl  Mean Cell Hemoglobin : 25.7 pg  Mean Cell Hemoglobin Concentration : 31.0 gm/dL  Auto Neutrophil # : 11.13 K/uL  Auto Lymphocyte # : 1.58 K/uL  Auto Monocyte # : 1.08 K/uL  Auto Eosinophil # : 0.50 K/uL  Auto Basophil # : 0.05 K/uL  Auto Neutrophil % : 76.6 %  Auto Lymphocyte % : 10.9 %  Auto Monocyte % : 7.4 %  Auto Eosinophil % : 3.4 %  Auto Basophil % : 0.3 %    05-27    138  |  106  |  6<L>  ----------------------------<  96  4.4   |  24  |  0.72    Ca    8.9      27 May 2023 07:04  Phos  4.0     05-27  Mg     2.1     05-27    TPro  5.2<L>  /  Alb  2.8<L>  /  TBili  0.2  /  DBili  x   /  AST  17  /  ALT  44  /  AlkPhos  114  05-27    PT/INR - ( 26 May 2023 14:47 )   PT: 14.7 sec;   INR: 1.23          PTT - ( 26 May 2023 14:47 )  PTT:34.7 sec                  RADIOLOGY & ADDITIONAL STUDIES (The following images were personally reviewed):

## 2023-05-27 NOTE — DIETITIAN INITIAL EVALUATION ADULT - PERTINENT LABORATORY DATA
05-27    138  |  106  |  6<L>  ----------------------------<  96  4.4   |  24  |  0.72    Ca    8.9      27 May 2023 07:04  Phos  4.0     05-27  Mg     2.1     05-27    TPro  5.2<L>  /  Alb  2.8<L>  /  TBili  0.2  /  DBili  x   /  AST  17  /  ALT  44  /  AlkPhos  114  05-27

## 2023-05-27 NOTE — DIETITIAN INITIAL EVALUATION ADULT - PROBLEM SELECTOR PLAN 5
F: None   E: Replete as necessary K>4 Mg>2  N: Full diet   DVT Prophylaxis: None   GI prophylaxis: None   CODE STATUS: FULL  DISPO: [facility]

## 2023-05-27 NOTE — DIETITIAN INITIAL EVALUATION ADULT - PROBLEM SELECTOR PLAN 2
Tachycardic to 105 on admission CXR showing middle lobe pna. Ddx including CAP vs pneumonia, PE, vs less likely TB as no hemoptysis. EKG Sinus tachy, Trops -.   - CTAP  with PE protocol, f/u results  - f/u Acid fast sputum  - f/u cx as above

## 2023-05-27 NOTE — DIETITIAN INITIAL EVALUATION ADULT - ADD RECOMMEND
1. Continue with diet order - add [employer] Standard 1.0 BID  2. Encourage pt to meed nutritional needs as able   3. Monitor labs: electrolytes, cmp  4. Monitor weights   5. Pain and bowel regimen per team   6. Will continue to assess/honor food preferences as able 1. Continue with diet order - add nxtControl Standard 1.0 BID  2. Encourage pt to meed nutritional needs as able   3. Monitor labs: electrolytes, cmp  4. Monitor weights   5. Pain and bowel regimen per team   6. Will continue to assess/honor food preferences as able  *discussed with team

## 2023-05-27 NOTE — DIETITIAN INITIAL EVALUATION ADULT - PROBLEM SELECTOR PLAN 3
CXR: Right upper and possibly lateral segment right middle lobe pneumonia with ipsilateral hilar lymphadenopathy. Atypical etiologies such as primary TB cannot be excluded in the appropriate clinical setting.  - Cover with abx as above  - f/u ID recs  - If decompensating consider fungal and atypical etiologies

## 2023-05-27 NOTE — CONSULT NOTE ADULT - PROBLEM SELECTOR RECOMMENDATION 2
3) Continue IV Ceftriaxone 1gr q24h and PO Azithromycin 500mg q24h  4) Negative Sputum AFB x1, pending 2 and 3; d/c isolation when reported negative  5) Pending  Legionella urine Ag and Mycoplasma PCR Sputum

## 2023-05-27 NOTE — DIETITIAN INITIAL EVALUATION ADULT - OTHER CALCULATIONS
Based on Standards of Care pt within % IBW (84%) thus actual body weight used for all calculations (125#). Needs adjusted for malnutrition, repletion.  Glycopyrrolate Pregnancy And Lactation Text: This medication is Pregnancy Category B and is considered safe during pregnancy. It is unknown if it is excreted breast milk.

## 2023-05-27 NOTE — DIETITIAN INITIAL EVALUATION ADULT - PROBLEM SELECTOR PLAN 4
Home medication:   - c/w ampyra (gf will bring in verify with pharmacy)  - baclofen 20mg BID  - c/w biotin 16389aq qd  - c/w vitamin D3 [id number] (next dose 5/28)  - modinifil 10mg BID PRN.

## 2023-05-27 NOTE — PROGRESS NOTE ADULT - SUBJECTIVE AND OBJECTIVE BOX
33-year-old male with PMH of MS on Ocrevus,  chronic sinusitis (s/p sinus surgery 5 weeks ago) prsenting with CC of productive cough and chest pain for 1.5 weeks. After the surgery, he started feeling fever, sweats, weight loss, headache, and generalized weakness x 2 weeks as well as a weight loss of 20lbs. He went to his PCP  who said it was likely flu. Since then, the weakness and fatigue had improved, but as of 1.5 weeks ago he started having insidious onset back and chest pain as well as dyspnea and productive cough. He went to see Dr. Fonseca today, where in the office he desaturated to 93% with  while ambulating. ICS was 40% of predicted and he had poor air movement.     ROS: Pt denies fever, chills, hemoptysis, palpitations, headache, dizziness, lower extremity swelling, recent travel.      ED Course: T 97.7  /81 RR20 Sat 94% on RA.  Labs: WBC 18.2 Platelets 573   RVP -, COVID -  CXR: Right upper and possibly lateral segment right middle lobe pneumonia with ipsilateral hilar lymphadenopathy. Atypical etiologies such as primary TB cannot be excluded in the appropriate clinical setting.    Interventions: 1LNS, 1x azithromycin, toradol.         Review of Systems:  Other Review of Systems: All other review of systems negative, except as noted in HPI      Allergies and Intolerances:        Allergies:  	No Known Allergies:     Home Medications:   * Patient Currently Takes Medications as of 26-May-2023 20:17 documented in Structured Notes  · 	biotin 5000 mcg oral capsule: Last Dose Taken:  , 2 tab(s) orally once a day  · 	vitamin A 50,000 units oral capsule: Last Dose Taken:  , 1 orally once  · 	baclofen 20 mg oral tablet: Last Dose Taken:  , 1 orally 2 times a day  · 	modafinil 100 mg oral tablet: Last Dose Taken:  , 1 tab(s) orally 2 times a day as needed for  anxiety  · 	Ampyra 10 mg oral tablet, extended release: Last Dose Taken:  , 1 orally 2 times a day    .    Patient History:    Past Medical, Past Surgical, and Family History:  PAST MEDICAL HISTORY:  Multiple sclerosis.     Social History:  · Substance use	Yes  · Alcohol use	Denies  · Substance use	States he used to smoke marijuana but has not smoked in 7 weeks.  · Tobacco use	Denies smoking.     Tobacco Screening:  · Core Measure Site	No    Risk Assessment:    Present on Admission:  Deep Venous Thrombosis	no  Pulmonary Embolus	no     HIV Screening:  · In accordance with [place] State law, we offer every patient who comes to our ED an HIV test. Would you like to be tested today?	Opt out    Physical Exam:   Physical Exam: PHYSICAL EXAM:  Constitutional: NAD, comfortable in bed.  HEENT: NC/AT, PERRLA, EOMI, no conjunctival pallor or scleral icterus, MMM  Neck: Supple, no JVD  Respiratory: CTA B/L. No w/r/r.   Cardiovascular: RRR, normal S1 and S2, no m/r/g.   Gastrointestinal: +BS, soft NTND, no guarding or rebound tenderness, no palpable masses   Extremities: wwp; no cyanosis, clubbing or edema.   Vascular: Pulses equal and strong throughout.   Neurological: AAOx3, no CN deficits, strength and sensation intact throughout.   Skin: No gross skin abnormalities or rashes       Labs and Results:  Labs, Radiology, Cardiology, and Other Results: LABS:                         11.5   18.21 )-----------( 573      ( 26 May 2023 14:47 )             37.0     05-26    138  |  101  |  8   ----------------------------<  109<H>  4.3   |  28  |  0.72    Ca    9.0      26 May 2023 14:47      PT/INR - ( 26 May 2023 14:47 )   PT: 14.7 sec;   INR: 1.23          PTT - ( 26 May 2023 14:47 )  PTT:34.7 sec    CARDIAC MARKERS ( 26 May 2023 14:47 )  x     / 0.01 ng/mL / 26 U/L / x     / <1.0 ng/mL        Lactate, Blood: 1.0 mmol/L (05-26 @ 15:55)      RADIOLOGY, EKG & ADDITIONAL TESTS:    Assessment and Plan:   · Completed VTE Risk Assessment(s)	Medical Assessment Completed on: 26-May-2023 17:56  · Completed VTE Risk Assessment(s)	Refer to the Assessment tab to view/cancel completed assessment.      · Assessment	  33-year-old male with PMH of MS on Ocrevus, chronic sinusitis (s/p sinus surgery 5 weeks ago) presenting with CC of productive cough, weight loss, and chest pain for 1.5 weeks, admitted for sepsis 2/2 pneumonia vs r/o PE vs TB.       Problem/Plan - 1:  ·  Problem: Sepsis.   ·  Plan: Patient meeting 2/4 sepsis criteria on admission source likely pneumonia seen on CXR. s/p 1L NS in ED.   - f/u BCx  - CTX 2g for HAP x 7 days and azithromycin 500mg x 3 days.  - f/u legionella, mycoplasma  - ID consulted f/u recs.  - c/w IV hydration NS @80cc/hr  - Trend white count.     Problem/Plan - 2:  ·  Problem: Dyspnea on exertion.   ·  Plan: Tachycardic to 105 on admission CXR showing middle lobe pna. Ddx including CAP vs pneumonia, PE, vs less likely TB as no hemoptysis. EKG Sinus tachy, Trops -.   - CTAP  with PE protocol, f/u results  - f/u Acid fast sputum  - f/u cx as above.     Problem/Plan - 3:  ·  Problem: Pneumonia.   ·  Plan: CXR: Right upper and possibly lateral segment right middle lobe pneumonia with ipsilateral hilar lymphadenopathy. Atypical etiologies such as primary TB cannot be excluded but is very unlikely     Problem/Plan - 4:  ·  Problem: Multiple sclerosis.   ·  Plan: Home medication:   - c/w ampyra (gf will bring in verify with pharmacy)  - baclofen 20mg BID  - c/w biotin [id number] qd  - c/w vitamin D3 14144 [id number] (next dose 5/28)  - modinifil 10mg BID PRN.     Problem/Plan - 5:  ·  Problem: Prophylactic measure.   ·  Plan: F: None   E: Replete as necessary K>4 Mg>2  N: Full diet   DVT Prophylaxis: None   GI prophylaxis: None   CODE STATUS: FULL  DISPO: RMF.    Leucocytosis responding quickly to cef/lucy. Therefore TB highly unlikely. PUD for Dr Fonseca    33-year-old male [place] crypto  with PMH of MS on Ocrevus, chronic sinusitis (s/p sinus surgery 5 weeks ago), presenting with productive cough and chest pain for 10 days. After the surgery, he started feeling fever, sweats, weight loss, headache, and generalized weakness x 2 weeks as well as a weight loss of 20 lbs. He went to his PCP  who said it was likely flu. Since then, the weakness and fatigue had improved, but as of 1.5 weeks ago he started having insidious onset back and chest pain as well as dyspnea and productive cough. He went to see Dr. Fonseca, where in the office he desaturated to 93% with  while ambulating. ICS was 40% of predicted and he had poor air movement.     ROS: Pt denies fever, chills, hemoptysis, palpitations, headache, dizziness, lower extremity swelling, recent travel.      ED Course: T 97.7  /81 RR20 Sat 94% on RA.  Labs: WBC 18.2 Platelets 573   RVP -, COVID -  CXR: Right upper and possibly lateral segment right middle lobe pneumonia with ipsilateral hilar lymphadenopathy. Atypical etiologies such as primary TB cannot be excluded in the appropriate clinical setting.    Interventions: 1LNS, 1x azithromycin, toradol.         Review of Systems:  Other Review of Systems: All other review of systems negative, except as noted in HPI      Allergies and Intolerances:        Allergies:  	No Known Allergies:     Home Medications:   * Patient Currently Takes Medications as of 26-May-2023 20:17 documented in Structured Notes  · 	biotin 5000 mcg oral capsule: Last Dose Taken:  , 2 tab(s) orally once a day  · 	vitamin A 50,000 units oral capsule: Last Dose Taken:  , 1 orally once  · 	baclofen 20 mg oral tablet: Last Dose Taken:  , 1 orally 2 times a day  · 	modafinil 100 mg oral tablet: Last Dose Taken:  , 1 tab(s) orally 2 times a day as needed for  anxiety  · 	Ampyra 10 mg oral tablet, extended release: Last Dose Taken:  , 1 orally 2 times a day    .    Patient History:    Past Medical, Past Surgical, and Family History:  PAST MEDICAL HISTORY:  Multiple sclerosis.     Social History:  · Substance use	Yes  · Alcohol use	Denies  · Substance use	States he used to smoke marijuana but has not smoked in 7 weeks.  · Tobacco use	Denies smoking.     Tobacco Screening:  · Core Measure Site	No    Risk Assessment:    Present on Admission:  Deep Venous Thrombosis	no  Pulmonary Embolus	no     HIV Screening:  · In accordance with [place] State law, we offer every patient who comes to our ED an HIV test. Would you like to be tested today?	Opt out    Physical Exam:   Physical Exam: PHYSICAL EXAM:  Constitutional: NAD, comfortable in bed.  HEENT: NC/AT, PERRLA, EOMI, no conjunctival pallor or scleral icterus, MMM  Neck: Supple, no JVD  Respiratory: RUL with decreased breath sounds. No w/r/r and no crackles. White sputum expectoration   Cardiovascular: RRR, normal S1 and S2, no m/r/g.   Gastrointestinal: +BS, soft NTND, no guarding or rebound tenderness, no palpable masses   Extremities: wwp; no cyanosis, clubbing or edema.   Vascular: Pulses equal and strong throughout.   Neurological: AAOx3, no CN deficits, strength and sensation intact throughout.   Skin: No gross skin abnormalities or rashes       Labs and Results:  Labs, Radiology, Cardiology, and Other Results: LABS:                         11.5   18.21 )-----------( 573      ( 26 May 2023 14:47 )             37.0     05-26    138  |  101  |  8   ----------------------------<  109<H>  4.3   |  28  |  0.72    Ca    9.0      26 May 2023 14:47      PT/INR - ( 26 May 2023 14:47 )   PT: 14.7 sec;   INR: 1.23          PTT - ( 26 May 2023 14:47 )  PTT:34.7 sec    CARDIAC MARKERS ( 26 May 2023 14:47 )  x     / 0.01 ng/mL / 26 U/L / x     / <1.0 ng/mL        Lactate, Blood: 1.0 mmol/L (05-26 @ 15:55)      RADIOLOGY, EKG & ADDITIONAL TESTS:    PULMONARY ANGIOGRAM: No pulmonary artery embolism. Main pulmonary artery   is of normal caliber.    LYMPH NODES: No bulky mediastinal lymphadenopathy. Limited evaluation of   the right hilum due to perihilar consolidation. There may be increased   number of nonenlarged subcarinal lymph nodes.    HEART/VASCULATURE: Heart size and pericardium are within normal limits.    AIRWAYS/LUNGS/PLEURA: Central airways are patent. There is right upper   lobe and superior segment to the right lower lobe consolidation with   tree-in-bud micronodular opacities and more patchy consolidation   involving the lateral segment and anterior basal segment of the right   middle and right lower lobes. Abbreviated differential includes   multifocal pneumonia and/or aspiration. There may be a 6 mm effusion node   in the lateral basal segment of the right lower lobe (11:231). There is   some unilateral interlobular septal thickening involving predominantly   the right lung which may representa superimposed asymmetric pulmonary   edema pattern. There is a trace right-sided pleural effusion.    UPPER ABDOMEN: Unremarkable.    BONES/SOFT TISSUES: No aggressive osseous lesions.    IMPRESSION:    1. No pulmonary artery embolism.  2. Multifocal pneumonia involving the right upper and right lower lobes   predominantly. Central airways are patent.   3. Superimposed mild interlobular septal thickening involving the right   hemithorax which may represent an asymmetric pulmonary edema pattern.      Assessment and Plan:   · Completed VTE Risk Assessment(s)	Medical Assessment Completed on: 26-May-2023 17:56  · Completed VTE Risk Assessment(s)	Refer to the Assessment tab to view/cancel completed assessment.      · Assessment	  33-year-old male with PMH of MS on Ocrevus, chronic sinusitis (s/p sinus surgery 5 weeks ago) presenting with CC of productive cough, weight loss, and chest pain for 1.5 weeks, admitted for sepsis 2/2 pneumonia vs r/o PE vs TB.       Problem/Plan - 1:  ·  Problem: Sepsis.   ·  Plan: Patient meeting 2/4 sepsis criteria on admission source likely pneumonia seen on CXR. s/p 1L NS in ED.   - f/u BCx  - CTX 2g for HAP x 7 days and azithromycin 500mg x 3 days.  - f/u legionella, mycoplasma  - c/w IV hydration NS @80cc/hr  - Trend white count.     Problem/Plan - 2:  ·  Problem: Dyspnea on exertion and hypoxia.   ·  Plan: Tachycardia to 105 on admission. CXR showing pna. Ddx including CAP vs pneumonia, PE, vs less likely TB as no hemoptysis. EKG Sinus tachy, Trops -.   - CTPA without  - f/u Acid fast sputum  - f/u cx as above.     Problem/Plan - 3:  ·  Problem: Pneumonia.   ·  Plan: CXR: Right upper and possibly lateral segment right middle lobe pneumonia with ipsilateral hilar lymphadenopathy. Atypical etiologies such as primary TB cannot be excluded but is very unlikely.  Presumed community acquired or hospital acquired?  No evidence of aspiration.     Problem/Plan - 4:  ·  Problem: Multiple sclerosis.   ·  Plan: Home medication:   - c/w ampyra (gf will bring in verify with pharmacy)  - baclofen 20mg BID  - c/w biotin [id number] qd  - c/w vitamin D3 49732 [id number] (next dose 5/28)  - modafinil 100 mg BID  - discuss discontinuation of modafinil as it is restricted medication.     Problem/Plan - 5:  ·  Problem: Prophylactic measure.   ·  Plan: F: None   E: Replete as necessary K>4 Mg>2  N: Full diet   DVT Prophylaxis: None   GI prophylaxis: None   CODE STATUS: FULL  DISPO: RMF.    Leucocytosis responding quickly to cef/lucy. Therefore TB highly unlikely.  First sputum AFB negative. Send two more sputa.  Discontinue isolation once 3 sputa negative for AFB

## 2023-05-28 DIAGNOSIS — J32.9 CHRONIC SINUSITIS, UNSPECIFIED: ICD-10-CM

## 2023-05-28 LAB
ALBUMIN SERPL ELPH-MCNC: 3.3 G/DL — SIGNIFICANT CHANGE UP (ref 3.3–5)
ALP SERPL-CCNC: 124 U/L — HIGH (ref 40–120)
ALT FLD-CCNC: 43 U/L — SIGNIFICANT CHANGE UP (ref 10–45)
ANION GAP SERPL CALC-SCNC: 8 MMOL/L — SIGNIFICANT CHANGE UP (ref 5–17)
AST SERPL-CCNC: 18 U/L — SIGNIFICANT CHANGE UP (ref 10–40)
BASOPHILS # BLD AUTO: 0.04 K/UL — SIGNIFICANT CHANGE UP (ref 0–0.2)
BASOPHILS NFR BLD AUTO: 0.3 % — SIGNIFICANT CHANGE UP (ref 0–2)
BILIRUB SERPL-MCNC: <0.2 MG/DL — SIGNIFICANT CHANGE UP (ref 0.2–1.2)
BUN SERPL-MCNC: 7 MG/DL — SIGNIFICANT CHANGE UP (ref 7–23)
CALCIUM SERPL-MCNC: 8.8 MG/DL — SIGNIFICANT CHANGE UP (ref 8.4–10.5)
CHLORIDE SERPL-SCNC: 102 MMOL/L — SIGNIFICANT CHANGE UP (ref 96–108)
CO2 SERPL-SCNC: 30 MMOL/L — SIGNIFICANT CHANGE UP (ref 22–31)
CREAT SERPL-MCNC: 0.74 MG/DL — SIGNIFICANT CHANGE UP (ref 0.5–1.3)
EGFR: 121 ML/MIN/1.73M2 — SIGNIFICANT CHANGE UP
EOSINOPHIL # BLD AUTO: 0.45 K/UL — SIGNIFICANT CHANGE UP (ref 0–0.5)
EOSINOPHIL NFR BLD AUTO: 3.9 % — SIGNIFICANT CHANGE UP (ref 0–6)
GLUCOSE SERPL-MCNC: 101 MG/DL — HIGH (ref 70–99)
HCT VFR BLD CALC: 35.9 % — LOW (ref 39–50)
HGB BLD-MCNC: 11.1 G/DL — LOW (ref 13–17)
IMM GRANULOCYTES NFR BLD AUTO: 1.7 % — HIGH (ref 0–0.9)
LEGIONELLA AG UR QL: NEGATIVE — SIGNIFICANT CHANGE UP
LYMPHOCYTES # BLD AUTO: 1.9 K/UL — SIGNIFICANT CHANGE UP (ref 1–3.3)
LYMPHOCYTES # BLD AUTO: 16.5 % — SIGNIFICANT CHANGE UP (ref 13–44)
MAGNESIUM SERPL-MCNC: 2.2 MG/DL — SIGNIFICANT CHANGE UP (ref 1.6–2.6)
MCHC RBC-ENTMCNC: 25.7 PG — LOW (ref 27–34)
MCHC RBC-ENTMCNC: 30.9 GM/DL — LOW (ref 32–36)
MCV RBC AUTO: 83.1 FL — SIGNIFICANT CHANGE UP (ref 80–100)
MONOCYTES # BLD AUTO: 0.76 K/UL — SIGNIFICANT CHANGE UP (ref 0–0.9)
MONOCYTES NFR BLD AUTO: 6.6 % — SIGNIFICANT CHANGE UP (ref 2–14)
NEUTROPHILS # BLD AUTO: 8.15 K/UL — HIGH (ref 1.8–7.4)
NEUTROPHILS NFR BLD AUTO: 71 % — SIGNIFICANT CHANGE UP (ref 43–77)
NIGHT BLUE STAIN TISS: SIGNIFICANT CHANGE UP
NIGHT BLUE STAIN TISS: SIGNIFICANT CHANGE UP
NRBC # BLD: 0 /100 WBCS — SIGNIFICANT CHANGE UP (ref 0–0)
PHOSPHATE SERPL-MCNC: 4.2 MG/DL — SIGNIFICANT CHANGE UP (ref 2.5–4.5)
PLATELET # BLD AUTO: 513 K/UL — HIGH (ref 150–400)
POTASSIUM SERPL-MCNC: 4.5 MMOL/L — SIGNIFICANT CHANGE UP (ref 3.5–5.3)
POTASSIUM SERPL-SCNC: 4.5 MMOL/L — SIGNIFICANT CHANGE UP (ref 3.5–5.3)
PROT SERPL-MCNC: 5.7 G/DL — LOW (ref 6–8.3)
RBC # BLD: 4.32 M/UL — SIGNIFICANT CHANGE UP (ref 4.2–5.8)
RBC # FLD: 13 % — SIGNIFICANT CHANGE UP (ref 10.3–14.5)
SODIUM SERPL-SCNC: 140 MMOL/L — SIGNIFICANT CHANGE UP (ref 135–145)
SPECIMEN SOURCE: SIGNIFICANT CHANGE UP
SPECIMEN SOURCE: SIGNIFICANT CHANGE UP
WBC # BLD: 11.49 K/UL — HIGH (ref 3.8–10.5)
WBC # FLD AUTO: 11.49 K/UL — HIGH (ref 3.8–10.5)

## 2023-05-28 RX ORDER — FLUTICASONE PROPIONATE 50 MCG
1 SPRAY, SUSPENSION NASAL
Refills: 0 | Status: DISCONTINUED | OUTPATIENT
Start: 2023-05-28 | End: 2023-05-29

## 2023-05-28 RX ORDER — BACLOFEN 100 %
20 POWDER (GRAM) MISCELLANEOUS
Refills: 0 | Status: DISCONTINUED | OUTPATIENT
Start: 2023-05-28 | End: 2023-05-29

## 2023-05-28 RX ORDER — FOLIC ACID/VIT B COMPLEX AND C 400 MCG
1 TABLET ORAL
Refills: 0 | DISCHARGE
Start: 2023-05-28

## 2023-05-28 RX ORDER — BACLOFEN 100 %
5 POWDER (GRAM) MISCELLANEOUS
Refills: 0 | Status: DISCONTINUED | OUTPATIENT
Start: 2023-05-28 | End: 2023-05-28

## 2023-05-28 RX ORDER — ONDANSETRON 8 MG/1
4 TABLET, FILM COATED ORAL ONCE
Refills: 0 | Status: COMPLETED | OUTPATIENT
Start: 2023-05-28 | End: 2023-05-28

## 2023-05-28 RX ADMIN — Medication 20 MILLIGRAM(S): at 07:28

## 2023-05-28 RX ADMIN — Medication 20 MILLIGRAM(S): at 20:09

## 2023-05-28 RX ADMIN — DALFAMPRIDINE 10 MILLIGRAM(S): 10 TABLET, FILM COATED, EXTENDED RELEASE ORAL at 19:58

## 2023-05-28 RX ADMIN — Medication 3 MILLIGRAM(S): at 21:30

## 2023-05-28 RX ADMIN — CEFTRIAXONE 100 MILLIGRAM(S): 500 INJECTION, POWDER, FOR SOLUTION INTRAMUSCULAR; INTRAVENOUS at 21:27

## 2023-05-28 RX ADMIN — ERGOCALCIFEROL 50000 UNIT(S): 1.25 CAPSULE ORAL at 07:16

## 2023-05-28 RX ADMIN — DALFAMPRIDINE 10 MILLIGRAM(S): 10 TABLET, FILM COATED, EXTENDED RELEASE ORAL at 07:16

## 2023-05-28 RX ADMIN — ONDANSETRON 4 MILLIGRAM(S): 8 TABLET, FILM COATED ORAL at 22:55

## 2023-05-28 NOTE — PROGRESS NOTE ADULT - PROBLEM SELECTOR PLAN 1
Patient meeting 2/4 sepsis criteria on admission source likely pneumonia seen on CXR.  Legionella neg.  Clinically improving on antibiotics, more likely bacterial etiology.  - f/u BCx  - CTX 2g for CAP x 7 days  - ID recs  - Trend white count  - AFB neg x1, f/u other 2 samples
1) Blood culture x 2 set negative 48 h  2) Procalcitonin low, sepsis unlikely.

## 2023-05-28 NOTE — PROGRESS NOTE ADULT - SUBJECTIVE AND OBJECTIVE BOX
INTERVAL HPI/OVERNIGHT EVENTS: Still coughing today, no fever, no SOB    CONSTITUTIONAL:  Negative fever or chills, feels better, good appetite  EYES:  Negative  blurry vision or double vision  CARDIOVASCULAR:  Negative for chest pain or palpitations  RESPIRATORY:  (+) cough, no wheezing, or SOB   GASTROINTESTINAL:  Negative for nausea, vomiting, diarrhea, constipation, or abdominal pain  GENITOURINARY:  Negative frequency, urgency or dysuria  NEUROLOGIC:  No headache, confusion, dizziness, lightheadedness      ANTIBIOTICS/RELEVANT:    MEDICATIONS  (STANDING):  baclofen 5 milliGRAM(s) Oral <User Schedule>  Biotin 10,000 mcg tablet 1 Tablet(s) 1 Capsule(s) Oral daily  cefTRIAXone   IVPB 1000 milliGRAM(s) IV Intermittent every 24 hours  dalfampridine ER 10 milliGRAM(s) Oral every 12 hours  ergocalciferol 78448 Unit(s) Oral <User Schedule>    MEDICATIONS  (PRN):  acetaminophen     Tablet .. 650 milliGRAM(s) Oral every 6 hours PRN Temp greater or equal to 38C (100.4F), Mild Pain (1 - 3)  melatonin 3 milliGRAM(s) Oral at bedtime PRN Insomnia  modafinil 100 milliGRAM(s) Oral daily PRN for anxiety        Vital Signs Last 24 Hrs  T(C): 36.8 (28 May 2023 12:58), Max: 36.8 (28 May 2023 12:58)  T(F): 98.2 (28 May 2023 12:58), Max: 98.2 (28 May 2023 12:58)  HR: 80 (28 May 2023 12:58) (79 - 94)  BP: 100/59 (28 May 2023 12:58) (87/57 - 112/72)  BP(mean): --  RR: 18 (28 May 2023 12:58) (18 - 20)  SpO2: 96% (28 May 2023 12:58) (96% - 97%)    Parameters below as of 28 May 2023 12:58  Patient On (Oxygen Delivery Method): room air        PHYSICAL EXAM:  Constitutional:Well-developed, well nourished  Eyes:NAM, EOMI  Ear/Nose/Throat: no oral lesion, no sinus tenderness on percussion	  Neck:no JVD, no lymphadenopathy, supple  Respiratory: Coarse breath sound champ  Gastrointestinal:soft, (+) BS, no HSM  Extremities: no e/e/c  Vascular: DP Pulse: right normal; left normal      LABS:                        11.1   11.49 )-----------( 513      ( 28 May 2023 07:36 )             35.9     05-28    140  |  102  |  7   ----------------------------<  101<H>  4.5   |  30  |  0.74    Ca    8.8      28 May 2023 07:36  Phos  4.2     05-28  Mg     2.2     05-28    TPro  5.7<L>  /  Alb  3.3  /  TBili  <0.2  /  DBili  x   /  AST  18  /  ALT  43  /  AlkPhos  124<H>  05-28    MICROBIOLOGY:  Culture - Acid Fast - Sputum w/Smear (05.27.23 @ 20:36)    Specimen Source: .Sputum   Acid Fast Bacilli Smear:   No acid-fast bacilli seen by fluorochrome stain    Culture - Acid Fast - Sputum w/Smear (05.27.23 @ 08:30)    Specimen Source: .Sputum   Acid Fast Bacilli Smear:   No acid-fast bacilli seen by fluorochrome stain    Culture - Acid Fast - Sputum w/Smear . (05.26.23 @ 18:32)    Specimen Source: .Sputum Sputum   Acid Fast Bacilli Smear:   No acid-fast bacilli seen by fluorochrome stain    Legionella pneumophila Antigen, Urine (05.28.23 @ 04:43)    Legionella Antigen, Urine: Negative:        RADIOLOGY & ADDITIONAL STUDIES:    ACC: [id number] EXAM:  CT ANGIO CHEST PULM ART WAWI   ORDERED BY: MARZENA LINN     PROCEDURE DATE:  05/26/2023        IMPRESSION:      1. No pulmonary artery embolism.  2. Multifocal pneumonia involving the right upper and right lower lobes   predominantly. Central airways are patent. Abbreviated differential   includes aspiration pneumonia. Interval surveillance to confirm   resolution is suggested.  3. Superimposed mild interlobular septal thickening involving the right   hemithorax which may represent an asymmetric pulmonary edema pattern.

## 2023-05-28 NOTE — PROGRESS NOTE ADULT - SUBJECTIVE AND OBJECTIVE BOX
INTERVAL EVENTS: JUSTIN    SUBJECTIVE / INTERVAL HPI: Patient seen and examined at bedside. Feeling better. Less coughing. Still with some b/l side rib pain but getting better.    ROS: negative unless otherwise stated above.    VITAL SIGNS:  Vital Signs Last 24 Hrs  T(C): 36.7 (28 May 2023 07:14), Max: 36.7 (27 May 2023 13:55)  T(F): 98 (28 May 2023 07:14), Max: 98.1 (27 May 2023 13:55)  HR: 79 (28 May 2023 07:15) (79 - 94)  BP: 96/57 (28 May 2023 07:15) (87/57 - 112/72)  BP(mean): --  RR: 18 (28 May 2023 07:15) (18 - 20)  SpO2: 96% (28 May 2023 07:15) (96% - 97%)    Parameters below as of 28 May 2023 07:15  Patient On (Oxygen Delivery Method): room air            PHYSICAL EXAM:    General: NAD, sitting up in bed  HEENT: dry oral MM, NAM  Neck: supple  Cardiovascular: +S1/S2; RRR, no MRG  Respiratory: CTA B/L; no W/R/R, no acc musc use  Gastrointestinal: soft, NT/ND  Extremities: warm and dry, no LE edema  Vascular: 2+ radial pulses B/L  Neurological: AAOx3; no focal deficits    MEDICATIONS:  MEDICATIONS  (STANDING):  baclofen 5 milliGRAM(s) Oral <User Schedule>  Biotin 10,000 mcg tablet 1 Tablet(s) 1 Capsule(s) Oral daily  cefTRIAXone   IVPB 1000 milliGRAM(s) IV Intermittent every 24 hours  dalfampridine ER 10 milliGRAM(s) Oral every 12 hours  ergocalciferol 25123 Unit(s) Oral <User Schedule>  Gentamicin 80mg/500ml 1 Application(s)   Nasal two times a day    MEDICATIONS  (PRN):  acetaminophen     Tablet .. 650 milliGRAM(s) Oral every 6 hours PRN Temp greater or equal to 38C (100.4F), Mild Pain (1 - 3)  melatonin 3 milliGRAM(s) Oral at bedtime PRN Insomnia  modafinil 100 milliGRAM(s) Oral daily PRN for anxiety      ALLERGIES:  Allergies    No Known Allergies    Intolerances        LABS:                        11.1   11.49 )-----------( 513      ( 28 May 2023 07:36 )             35.9     05-28    140  |  102  |  7   ----------------------------<  101<H>  4.5   |  30  |  0.74    Ca    8.8      28 May 2023 07:36  Phos  4.2     05-28  Mg     2.2     05-28    TPro  5.7<L>  /  Alb  3.3  /  TBili  <0.2  /  DBili  x   /  AST  18  /  ALT  43  /  AlkPhos  124<H>  05-28    PT/INR - ( 26 May 2023 14:47 )   PT: 14.7 sec;   INR: 1.23          PTT - ( 26 May 2023 14:47 )  PTT:34.7 sec    CAPILLARY BLOOD GLUCOSE          RADIOLOGY & ADDITIONAL TESTS: Reviewed.

## 2023-05-28 NOTE — PROGRESS NOTE ADULT - SUBJECTIVE AND OBJECTIVE BOX
PUD for Dr Fonseca    33-year-old male French crypto  with PMH of MS on Ocrevus, chronic sinusitis (s/p sinus surgery 5 weeks ago), presenting with productive cough and chest pain for 10 days. After the surgery, he started feeling fever, sweats, weight loss, headache, and generalized weakness x 2 weeks as well as a weight loss of 20 lbs. He went to his PCP  who said it was likely flu. Since then, the weakness and fatigue had improved, but as of 1.5 weeks ago he started having insidious onset back and chest pain as well as dyspnea and productive cough. He went to see Dr. Fonseca, where in the office he desaturated to 93% with  while ambulating. ICS was 40% of predicted and he had poor air movement.     ROS: Pt denies fever, chills, hemoptysis, palpitations, headache, dizziness, lower extremity swelling, recent travel.      ED Course: T 97.7  /81 RR20 Sat 94% on RA.  Labs: WBC 18.2 Platelets 573   RVP -, COVID -  CXR: Right upper and possibly lateral segment right middle lobe pneumonia with ipsilateral hilar lymphadenopathy. Atypical etiologies such as primary TB cannot be excluded in the appropriate clinical setting.    Interventions: 1LNS, 1x azithromycin, toradol.         Review of Systems:  Other Review of Systems: All other review of systems negative, except as noted in HPI      Allergies and Intolerances:        Allergies:  	No Known Allergies:     Home Medications:   * Patient Currently Takes Medications as of 26-May-2023 20:17 documented in Structured Notes  · 	biotin 5000 mcg oral capsule: Last Dose Taken:  , 2 tab(s) orally once a day  · 	vitamin A 50,000 units oral capsule: Last Dose Taken:  , 1 orally once  · 	baclofen 20 mg oral tablet: Last Dose Taken:  , 1 orally 2 times a day  · 	modafinil 100 mg oral tablet: Last Dose Taken:  , 1 tab(s) orally 2 times a day as needed for  anxiety  · 	Ampyra 10 mg oral tablet, extended release: Last Dose Taken:  , 1 orally 2 times a day    .    Patient History:    Past Medical, Past Surgical, and Family History:  PAST MEDICAL HISTORY:  Multiple sclerosis.     Social History:  · Substance use	Yes  · Alcohol use	Denies  · Substance use	States he used to smoke marijuana but has not smoked in 7 weeks.  · Tobacco use	Denies smoking.     Tobacco Screening:  · Core Measure Site	No    Risk Assessment:    Present on Admission:  Deep Venous Thrombosis	no  Pulmonary Embolus	no     HIV Screening:  · In accordance with [place] State law, we offer every patient who comes to our ED an HIV test. Would you like to be tested today?	Opt out    Physical Exam:   Physical Exam: PHYSICAL EXAM:  Constitutional: NAD, comfortable in bed.  HEENT: NC/AT, PERRLA, EOMI, no conjunctival pallor or scleral icterus, MMM  Neck: Supple, no JVD  Respiratory: RUL with decreased breath sounds. No w/r/r and no crackles. White sputum expectoration   Cardiovascular: RRR, normal S1 and S2, no m/r/g.   Gastrointestinal: +BS, soft NTND, no guarding or rebound tenderness, no palpable masses   Extremities: wwp; no cyanosis, clubbing or edema.   Vascular: Pulses equal and strong throughout.   Neurological: AAOx3, no CN deficits, strength and sensation intact throughout.   Skin: No gross skin abnormalities or rashes       Labs and Results:  Labs, Radiology, Cardiology, and Other Results: LABS:                         11.5   18.21 )-----------( 573      ( 26 May 2023 14:47 )             37.0     05-26    138  |  101  |  8   ----------------------------<  109<H>  4.3   |  28  |  0.72    Ca    9.0      26 May 2023 14:47      PT/INR - ( 26 May 2023 14:47 )   PT: 14.7 sec;   INR: 1.23          PTT - ( 26 May 2023 14:47 )  PTT:34.7 sec    CARDIAC MARKERS ( 26 May 2023 14:47 )  x     / 0.01 ng/mL / 26 U/L / x     / <1.0 ng/mL        Lactate, Blood: 1.0 mmol/L (05-26 @ 15:55)      RADIOLOGY, EKG & ADDITIONAL TESTS:    PULMONARY ANGIOGRAM: No pulmonary artery embolism. Main pulmonary artery   is of normal caliber.    LYMPH NODES: No bulky mediastinal lymphadenopathy. Limited evaluation of   the right hilum due to perihilar consolidation. There may be increased   number of nonenlarged subcarinal lymph nodes.    HEART/VASCULATURE: Heart size and pericardium are within normal limits.    AIRWAYS/LUNGS/PLEURA: Central airways are patent. There is right upper   lobe and superior segment to the right lower lobe consolidation with   tree-in-bud micronodular opacities and more patchy consolidation   involving the lateral segment and anterior basal segment of the right   middle and right lower lobes. Abbreviated differential includes   multifocal pneumonia and/or aspiration. There may be a 6 mm effusion node   in the lateral basal segment of the right lower lobe (11:231). There is   some unilateral interlobular septal thickening involving predominantly   the right lung which may representa superimposed asymmetric pulmonary   edema pattern. There is a trace right-sided pleural effusion.    UPPER ABDOMEN: Unremarkable.    BONES/SOFT TISSUES: No aggressive osseous lesions.    IMPRESSION:    1. No pulmonary artery embolism.  2. Multifocal pneumonia involving the right upper and right lower lobes   predominantly. Central airways are patent.   3. Superimposed mild interlobular septal thickening involving the right   hemithorax which may represent an asymmetric pulmonary edema pattern.      Assessment and Plan:   · Completed VTE Risk Assessment(s)	Medical Assessment Completed on: 26-May-2023 17:56  · Completed VTE Risk Assessment(s)	Refer to the Assessment tab to view/cancel completed assessment.      · Assessment	  33-year-old male with PMH of MS on Ocrevus, chronic sinusitis (s/p sinus surgery 5 weeks ago) presenting with CC of productive cough, weight loss, and chest pain for 1.5 weeks, admitted for sepsis 2/2 pneumonia vs r/o PE vs TB.       Problem/Plan - 1:  ·  Problem: Sepsis.   ·  Plan: Patient meeting 2/4 sepsis criteria on admission source likely pneumonia seen on CXR. s/p 1L NS in ED.   - f/u BCx  - CTX 2g for HAP x 7 days and azithromycin 500mg x 3 days.  - f/u legionella, mycoplasma  - c/w IV hydration NS @80cc/hr  - Trend white count.     Problem/Plan - 2:  ·  Problem: Dyspnea on exertion and hypoxia.   ·  Plan: Tachycardia to 105 on admission. CXR showing pna. Ddx including CAP vs pneumonia, PE, vs less likely TB as no hemoptysis. EKG Sinus tachy, Trops -.   - CTPA without  - f/u Acid fast sputum  - f/u cx as above.     Problem/Plan - 3:  ·  Problem: Pneumonia.   ·  Plan: CXR: Right upper and possibly lateral segment right middle lobe pneumonia with ipsilateral hilar lymphadenopathy. Atypical etiologies such as primary TB cannot be excluded but is very unlikely.  Presumed community acquired.  HAP is unlikely due to rapid decrease of leucocytosis.  No evidence of aspiration.     Problem/Plan - 4:  ·  Problem: Multiple sclerosis.   ·  Plan: Home medication:   - c/w ampyra (gf will bring in verify with pharmacy)  - baclofen 20mg BID  - c/w biotin 37508ad qd  - c/w vitamin D3 70953 [id number] (next dose 5/28)  - modafinil 100 mg BID  - discuss discontinuation of modafinil as it is restricted medication.     Problem/Plan - 5:  ·  Problem: Prophylactic measure.   ·  Plan: F: None   E: Replete as necessary K>4 Mg>2  N: Full diet   DVT Prophylaxis: None   GI prophylaxis: None   CODE STATUS: FULL  DISPO: RMF.    WEIGHT LOSS of 20 pounds is unusual. Appetite is increasing    FOUR WEEKS OF NIGHT SWEATS are also unusual. But improving on cef/lucy.    Leucocytosis responding quickly to cef/lucy. Therefore TB highly unlikely.  First sputum AFB negative. Send two more sputa.  Discontinue isolation once 3 sputa negative for AFB

## 2023-05-28 NOTE — PROGRESS NOTE ADULT - SUBJECTIVE AND OBJECTIVE BOX
Pt seen and examined   no complaints  better and better each day  less chest pain  less cough    REVIEW OF SYSTEMS:  Constitutional: No fever,   Cardiovascular: No chest pain, palpitations, dizziness   Rsp: no sob  Gastrointestinal: No abdominal or epigastric pain. No nausea, vomiting  No diarrhea    Skin: No itching, burning, rashes or lesions       MEDICATIONS:  MEDICATIONS  (STANDING):  baclofen 5 milliGRAM(s) Oral <User Schedule>  Biotin 10,000 mcg tablet 1 Tablet(s) 1 Capsule(s) Oral daily  cefTRIAXone   IVPB 1000 milliGRAM(s) IV Intermittent every 24 hours  dalfampridine ER 10 milliGRAM(s) Oral every 12 hours  ergocalciferol 84160 Unit(s) Oral <User Schedule>  Gentamicin 80mg/500ml 1 Application(s)   Nasal two times a day    MEDICATIONS  (PRN):  acetaminophen     Tablet .. 650 milliGRAM(s) Oral every 6 hours PRN Temp greater or equal to 38C (100.4F), Mild Pain (1 - 3)  melatonin 3 milliGRAM(s) Oral at bedtime PRN Insomnia  modafinil 100 milliGRAM(s) Oral daily PRN for anxiety      Allergies    No Known Allergies    Intolerances        Vital Signs Last 24 Hrs  T(C): 36.7 (28 May 2023 07:14), Max: 36.7 (27 May 2023 13:55)  T(F): 98 (28 May 2023 07:14), Max: 98.1 (27 May 2023 13:55)  HR: 79 (28 May 2023 07:15) (79 - 94)  BP: 96/57 (28 May 2023 07:15) (87/57 - 112/72)  BP(mean): --  RR: 18 (28 May 2023 07:15) (18 - 20)  SpO2: 96% (28 May 2023 07:15) (96% - 97%)    Parameters below as of 28 May 2023 07:15  Patient On (Oxygen Delivery Method): room air          PHYSICAL EXAM:    General: thin, in no acute distress  HEENT: MMM, conjunctiva and sclera clear  Lungs: clear  Heart: regular  Gastrointestinal: Soft non-tender non-distended; Normal bowel sounds;   Skin: Warm and dry. No obvious rash    LABS:      CBC Full  -  ( 28 May 2023 07:36 )  WBC Count : 11.49 K/uL  RBC Count : 4.32 M/uL  Hemoglobin : 11.1 g/dL  Hematocrit : 35.9 %  Platelet Count - Automated : 513 K/uL  Mean Cell Volume : 83.1 fl  Mean Cell Hemoglobin : 25.7 pg  Mean Cell Hemoglobin Concentration : 30.9 gm/dL  Auto Neutrophil # : 8.15 K/uL  Auto Lymphocyte # : 1.90 K/uL  Auto Monocyte # : 0.76 K/uL  Auto Eosinophil # : 0.45 K/uL  Auto Basophil # : 0.04 K/uL  Auto Neutrophil % : 71.0 %  Auto Lymphocyte % : 16.5 %  Auto Monocyte % : 6.6 %  Auto Eosinophil % : 3.9 %  Auto Basophil % : 0.3 %    05-28    140  |  102  |  7   ----------------------------<  101<H>  4.5   |  30  |  0.74    Ca    8.8      28 May 2023 07:36  Phos  4.2     05-28  Mg     2.2     05-28    TPro  5.7<L>  /  Alb  3.3  /  TBili  <0.2  /  DBili  x   /  AST  18  /  ALT  43  /  AlkPhos  124<H>  05-28    PT/INR - ( 26 May 2023 14:47 )   PT: 14.7 sec;   INR: 1.23          PTT - ( 26 May 2023 14:47 )  PTT:34.7 sec                  RADIOLOGY & ADDITIONAL STUDIES (The following images were personally reviewed):

## 2023-05-28 NOTE — PROGRESS NOTE ADULT - PROBLEM SELECTOR PLAN 2
3) Continue IV Ceftriaxone 1gr q24h and PO Azithromycin 500mg q24h, Day#3  4) Negative Sputum AFB x3, ; d/c isolation   5) Legionella urine Ag negative and Mycoplasma PCR Sputum pending
CXR: Right upper and possibly lateral segment right middle lobe pneumonia with ipsilateral hilar lymphadenopathy. Atypical etiologies such as primary TB cannot be excluded in the appropriate clinical setting.  - Cover with abx as above  - f/u ID recs  - If decompensating consider fungal and atypical etiologies

## 2023-05-28 NOTE — PROGRESS NOTE ADULT - PROBLEM SELECTOR PLAN 4
Home medication:   - c/w ampyra (gf will bring in verify with pharmacy)  - baclofen 20mg BID  - c/w biotin 84931ht qd  - c/w vitamin D3 04102 [id number] (next dose 5/28)  - modinifil 10mg BID PRN.

## 2023-05-28 NOTE — PROGRESS NOTE ADULT - PROBLEM SELECTOR PLAN 3
Sees Dr. Garrido outpatient ENT. Recent procedure. On nasal gentamicin but it seems to be .    - will contact Dr. Garrido to prescribe if its still needed (our pharmacy does not have)  - saline rinses

## 2023-05-28 NOTE — PROGRESS NOTE ADULT - PROBLEM SELECTOR PLAN 5
F: None   E: Replenish as appropriate  N: Full diet   DVT Prophylaxis: None   GI prophylaxis: None   CODE STATUS: FULL  DISPO: home when able

## 2023-05-29 ENCOUNTER — TRANSCRIPTION ENCOUNTER (OUTPATIENT)
Age: 33
End: 2023-05-29

## 2023-05-29 VITALS
TEMPERATURE: 98 F | OXYGEN SATURATION: 97 % | DIASTOLIC BLOOD PRESSURE: 62 MMHG | RESPIRATION RATE: 18 BRPM | SYSTOLIC BLOOD PRESSURE: 100 MMHG | HEART RATE: 76 BPM

## 2023-05-29 LAB
ANION GAP SERPL CALC-SCNC: 10 MMOL/L — SIGNIFICANT CHANGE UP (ref 5–17)
BASOPHILS # BLD AUTO: 0.04 K/UL — SIGNIFICANT CHANGE UP (ref 0–0.2)
BASOPHILS NFR BLD AUTO: 0.4 % — SIGNIFICANT CHANGE UP (ref 0–2)
BUN SERPL-MCNC: 8 MG/DL — SIGNIFICANT CHANGE UP (ref 7–23)
CALCIUM SERPL-MCNC: 8.7 MG/DL — SIGNIFICANT CHANGE UP (ref 8.4–10.5)
CHLORIDE SERPL-SCNC: 101 MMOL/L — SIGNIFICANT CHANGE UP (ref 96–108)
CO2 SERPL-SCNC: 28 MMOL/L — SIGNIFICANT CHANGE UP (ref 22–31)
CREAT SERPL-MCNC: 0.71 MG/DL — SIGNIFICANT CHANGE UP (ref 0.5–1.3)
EGFR: 123 ML/MIN/1.73M2 — SIGNIFICANT CHANGE UP
EOSINOPHIL # BLD AUTO: 0.32 K/UL — SIGNIFICANT CHANGE UP (ref 0–0.5)
EOSINOPHIL NFR BLD AUTO: 2.9 % — SIGNIFICANT CHANGE UP (ref 0–6)
GLUCOSE SERPL-MCNC: 94 MG/DL — SIGNIFICANT CHANGE UP (ref 70–99)
HCT VFR BLD CALC: 37.2 % — LOW (ref 39–50)
HGB BLD-MCNC: 11.5 G/DL — LOW (ref 13–17)
IMM GRANULOCYTES NFR BLD AUTO: 1.4 % — HIGH (ref 0–0.9)
LYMPHOCYTES # BLD AUTO: 1.46 K/UL — SIGNIFICANT CHANGE UP (ref 1–3.3)
LYMPHOCYTES # BLD AUTO: 13.2 % — SIGNIFICANT CHANGE UP (ref 13–44)
MCHC RBC-ENTMCNC: 25.3 PG — LOW (ref 27–34)
MCHC RBC-ENTMCNC: 30.9 GM/DL — LOW (ref 32–36)
MCV RBC AUTO: 81.9 FL — SIGNIFICANT CHANGE UP (ref 80–100)
MONOCYTES # BLD AUTO: 0.69 K/UL — SIGNIFICANT CHANGE UP (ref 0–0.9)
MONOCYTES NFR BLD AUTO: 6.2 % — SIGNIFICANT CHANGE UP (ref 2–14)
NEUTROPHILS # BLD AUTO: 8.38 K/UL — HIGH (ref 1.8–7.4)
NEUTROPHILS NFR BLD AUTO: 75.9 % — SIGNIFICANT CHANGE UP (ref 43–77)
NRBC # BLD: 0 /100 WBCS — SIGNIFICANT CHANGE UP (ref 0–0)
PLATELET # BLD AUTO: 527 K/UL — HIGH (ref 150–400)
POTASSIUM SERPL-MCNC: 4.1 MMOL/L — SIGNIFICANT CHANGE UP (ref 3.5–5.3)
POTASSIUM SERPL-SCNC: 4.1 MMOL/L — SIGNIFICANT CHANGE UP (ref 3.5–5.3)
RBC # BLD: 4.54 M/UL — SIGNIFICANT CHANGE UP (ref 4.2–5.8)
RBC # FLD: 13.2 % — SIGNIFICANT CHANGE UP (ref 10.3–14.5)
SODIUM SERPL-SCNC: 139 MMOL/L — SIGNIFICANT CHANGE UP (ref 135–145)
WBC # BLD: 11.05 K/UL — HIGH (ref 3.8–10.5)
WBC # FLD AUTO: 11.05 K/UL — HIGH (ref 3.8–10.5)

## 2023-05-29 PROCEDURE — 87449 NOS EACH ORGANISM AG IA: CPT

## 2023-05-29 PROCEDURE — 87015 SPECIMEN INFECT AGNT CONCNTJ: CPT

## 2023-05-29 PROCEDURE — 85610 PROTHROMBIN TIME: CPT

## 2023-05-29 PROCEDURE — 0225U NFCT DS DNA&RNA 21 SARSCOV2: CPT

## 2023-05-29 PROCEDURE — 80048 BASIC METABOLIC PNL TOTAL CA: CPT

## 2023-05-29 PROCEDURE — 71275 CT ANGIOGRAPHY CHEST: CPT | Mod: MA

## 2023-05-29 PROCEDURE — 87206 SMEAR FLUORESCENT/ACID STAI: CPT

## 2023-05-29 PROCEDURE — 85730 THROMBOPLASTIN TIME PARTIAL: CPT

## 2023-05-29 PROCEDURE — 87116 MYCOBACTERIA CULTURE: CPT

## 2023-05-29 PROCEDURE — 84145 PROCALCITONIN (PCT): CPT

## 2023-05-29 PROCEDURE — 96365 THER/PROPH/DIAG IV INF INIT: CPT

## 2023-05-29 PROCEDURE — 36415 COLL VENOUS BLD VENIPUNCTURE: CPT

## 2023-05-29 PROCEDURE — 83605 ASSAY OF LACTIC ACID: CPT

## 2023-05-29 PROCEDURE — 71045 X-RAY EXAM CHEST 1 VIEW: CPT

## 2023-05-29 PROCEDURE — 85025 COMPLETE CBC W/AUTO DIFF WBC: CPT

## 2023-05-29 PROCEDURE — 82550 ASSAY OF CK (CPK): CPT

## 2023-05-29 PROCEDURE — 99285 EMERGENCY DEPT VISIT HI MDM: CPT | Mod: 25

## 2023-05-29 PROCEDURE — 83735 ASSAY OF MAGNESIUM: CPT

## 2023-05-29 PROCEDURE — 87040 BLOOD CULTURE FOR BACTERIA: CPT

## 2023-05-29 PROCEDURE — 94640 AIRWAY INHALATION TREATMENT: CPT

## 2023-05-29 PROCEDURE — 82553 CREATINE MB FRACTION: CPT

## 2023-05-29 PROCEDURE — 84484 ASSAY OF TROPONIN QUANT: CPT

## 2023-05-29 PROCEDURE — 84100 ASSAY OF PHOSPHORUS: CPT

## 2023-05-29 PROCEDURE — 96375 TX/PRO/DX INJ NEW DRUG ADDON: CPT

## 2023-05-29 PROCEDURE — 86480 TB TEST CELL IMMUN MEASURE: CPT

## 2023-05-29 PROCEDURE — 80053 COMPREHEN METABOLIC PANEL: CPT

## 2023-05-29 RX ORDER — FLUTICASONE PROPIONATE 50 MCG
1 SPRAY, SUSPENSION NASAL
Qty: 0 | Refills: 0 | DISCHARGE
Start: 2023-05-29

## 2023-05-29 RX ORDER — CEFTRIAXONE 500 MG/1
1000 INJECTION, POWDER, FOR SOLUTION INTRAMUSCULAR; INTRAVENOUS EVERY 24 HOURS
Refills: 0 | Status: COMPLETED | OUTPATIENT
Start: 2023-05-29 | End: 2023-05-29

## 2023-05-29 RX ADMIN — CEFTRIAXONE 100 MILLIGRAM(S): 500 INJECTION, POWDER, FOR SOLUTION INTRAMUSCULAR; INTRAVENOUS at 16:13

## 2023-05-29 RX ADMIN — Medication 20 MILLIGRAM(S): at 07:37

## 2023-05-29 RX ADMIN — DALFAMPRIDINE 10 MILLIGRAM(S): 10 TABLET, FILM COATED, EXTENDED RELEASE ORAL at 07:36

## 2023-05-29 RX ADMIN — Medication 1 SPRAY(S): at 07:35

## 2023-05-29 NOTE — PROGRESS NOTE ADULT - ASSESSMENT
pneumonia  admit for IV antibiotics including coverage for atypical pneumonia  IV hydrate as he looks dry  sputum culture  legionella and mycoplasma  CT of cgest  bronchodilator  DVT prophylaxis
wbc better  antibiotics per ID  likely d/c soon if okay with ID and Pumonary
CT chest noted  continue antibiotics  sputum culture  PPD  hydrate some more  wbc better
33-year-old male with past medical history of MS on Ocrevus,  chronic sinusitis  status post sinus surgery 5 weeks ago complaining of productive cough, chest pain and back pain x1.5 weeks and shortness of breath, CT Angio c/w Multifocal pneumonia, admitted for IV antibiotics for Pneumonia/Sepsis and to r/o TB  
discharge home on po if okay with ID and pulmonary  see Dr Fonseca Thursday or Friday for follow up  however if Pulmoanry or ID recommends stay till tomorrow it is not unreasonable
33-year-old male with PMH of MS on Ocrevus, chronic sinusitis (s/p sinus surgery 5 weeks ago) presenting with CC of productive cough, weight loss, and chest pain for 1.5 weeks, admitted for sepsis 2/2 pneumonia. Also on TB precautions for r/o TB however etiology more likely bacterial as pt has responded to antibiotics.

## 2023-05-29 NOTE — DISCHARGE NOTE PROVIDER - HOSPITAL COURSE
#Discharge: do not delete    33-year-old male with PMH of MS on Ocrevus, chronic sinusitis (s/p sinus surgery 5 weeks ago) presenting with CC of productive cough, weight loss, and chest pain for 1.5 weeks, admitted for sepsis 2/2 pneumonia vs r/o PE vs TB. PE was ruled out and TB AFB negative x3. weaned to room air and clinically improving on ceftriaxone.     Problem List/Main Diagnoses (system-based):   ·  Problem: Sepsis.   ·  Plan: Patient meeting 2/4 sepsis criteria on admission source likely pneumonia seen on CXR.  Legionella neg. Clinically improving on antibiotics, more likely bacterial etiology. AFB neg x3. BCx NGTD  - f/u BCx  - CTX 2g for CAP x 7 days - transitioned to cefpodoxime on discharge       Problem/Plan - 2:  ·  Problem: Pneumonia.   ·  Plan: CXR: Right upper and possibly lateral segment right middle lobe pneumonia with ipsilateral hilar lymphadenopathy. Initial concern for TB. AFB negative x3  - Cover with abx as above       Problem/Plan - 3:  ·  Problem: Chronic sinusitis.   ·  Plan: Sees Dr. Garrido outpatient ENT. Recent procedure. On nasal gentamicin but it seems to be .  - will contact Dr. Garrido to prescribe if its still needed (our pharmacy does not have)  - saline rinses.     Problem/Plan - 4:  ·  Problem: Multiple sclerosis.   ·  Plan: Home medication:   - c/w ampyra (gf will bring in verify with pharmacy)  - baclofen 20mg BID  - c/w biotin 23587pm qd  - c/w vitamin D3 74350 g9vivsu (next dose )  - modinifil 10mg BID PRN.      New medications: Cefpodoxime  Labs to be followed outpatient: cbc  Exam to be followed outpatient: pulm, ID    PHYSICAL EXAM:    General: NAD, sitting up in bed  HEENT: dry oral MM, NAM  Neck: supple  Cardiovascular: +S1/S2; RRR, no MRG  Respiratory: CTA B/L; no W/R/R, no acc musc use  Gastrointestinal: soft, NT/ND  Extremities: warm and dry, no LE edema  Vascular: 2+ radial pulses B/L  Neurological: AAOx3; no focal deficits   #Discharge: do not delete    33-year-old male with PMH of MS on Ocrevus, chronic sinusitis (s/p sinus surgery 5 weeks ago) presenting with CC of productive cough, weight loss, and chest pain for 1.5 weeks, admitted for sepsis 2/2 pneumonia vs r/o PE vs TB. PE was ruled out and TB AFB negative x3. weaned to room air and clinically improving on ceftriaxone.     Problem List/Main Diagnoses (system-based):   ·  Problem: Sepsis.   ·  Plan: Patient meeting 2/4 sepsis criteria on admission source likely pneumonia seen on CXR.  Legionella neg. Clinically improving on antibiotics, more likely bacterial etiology. AFB neg x3. BCx NGTD  - f/u BCx  - CTX 1g x3 days followed by for Cefpodoxime x 7 days - for CAP       Problem/Plan - 2:  ·  Problem: Pneumonia.   ·  Plan: CXR: Right upper and possibly lateral segment right middle lobe pneumonia with ipsilateral hilar lymphadenopathy. Initial concern for TB. AFB negative x3  - Cover with abx as above       Problem/Plan - 3:  ·  Problem: Chronic sinusitis.   ·  Plan: Sees Dr. Garrido outpatient ENT. Recent procedure. On nasal gentamicin but it seems to be .  - will contact Dr. Garrido to prescribe if its still needed (our pharmacy does not have)  - saline rinses.     Problem/Plan - 4:  ·  Problem: Multiple sclerosis.   ·  Plan: Home medication:   - c/w ampyra (gf will bring in verify with pharmacy)  - baclofen 20mg BID  - c/w biotin 28010zv qd  - c/w vitamin D3 45345 h9ykipz (next dose )  - modinifil 10mg BID PRN.      New medications: Cefpodoxime  Labs to be followed outpatient: cbc  Exam to be followed outpatient: pulm, ID    PHYSICAL EXAM:    General: NAD, sitting up in bed  HEENT: dry oral MM, NAM  Neck: supple  Cardiovascular: +S1/S2; RRR, no MRG  Respiratory: CTA B/L; no W/R/R, no acc musc use  Gastrointestinal: soft, NT/ND  Extremities: warm and dry, no LE edema  Vascular: 2+ radial pulses B/L  Neurological: AAOx3; no focal deficits

## 2023-05-29 NOTE — DISCHARGE NOTE NURSING/CASE MANAGEMENT/SOCIAL WORK - NSDCPEFALRISK_GEN_ALL_CORE
For information on Fall & Injury Prevention, visit: [email or web address] OR  [email or web address] OR  https://www.cdc.gov/steadi/patient.html

## 2023-05-29 NOTE — DISCHARGE NOTE PROVIDER - DETAILS OF MALNUTRITION DIAGNOSIS/DIAGNOSES
This patient has been assessed with a concern for Malnutrition and was treated during this hospitalization for the following Nutrition diagnosis/diagnoses:     -  05/27/2023: Severe protein-calorie malnutrition

## 2023-05-29 NOTE — DISCHARGE NOTE PROVIDER - PROVIDER TOKENS
PROVIDER:[TOKEN:[[id number]],FOLLOWUP:[1 week]],PROVIDER:[TOKEN:[[id number]],FOLLOWUP:[2 weeks]] PROVIDER:[TOKEN:[[id number]],FOLLOWUP:[1 week]],PROVIDER:[TOKEN:[[id number]],FOLLOWUP:[2 weeks]],PROVIDER:[TOKEN:[[id number]],FOLLOWUP:[1 week]]

## 2023-05-29 NOTE — DISCHARGE NOTE PROVIDER - NSDCMRMEDTOKEN_GEN_ALL_CORE_FT
Ampyra 10 mg oral tablet, extended release: 1 orally 2 times a day  baclofen 20 mg oral tablet: 1 orally 2 times a day  biotin 5000 mcg oral capsule: 2 tab(s) orally once a day  fluticasone 50 mcg/inh nasal spray: 1 spray(s) nasal 2 times a day  modafinil 100 mg oral tablet: 1 tab(s) orally 2 times a day as needed for  anxiety  vitamin A 50,000 units oral capsule: 1 orally once   Ampyra 10 mg oral tablet, extended release: 1 orally 2 times a day  baclofen 20 mg oral tablet: 1 orally 2 times a day  biotin 5000 mcg oral capsule: 2 tab(s) orally once a day  cefpodoxime 200 mg oral tablet: 1 tab(s) orally every 12 hours  fluticasone 50 mcg/inh nasal spray: 1 spray(s) nasal 2 times a day  modafinil 100 mg oral tablet: 1 tab(s) orally 2 times a day as needed for  anxiety  vitamin A 50,000 units oral capsule: 1 orally once

## 2023-05-29 NOTE — DISCHARGE NOTE NURSING/CASE MANAGEMENT/SOCIAL WORK - PATIENT PORTAL LINK FT
You can access the FollowMyHealth Patient Portal offered by [facility] by registering at the following website: [email or web address]. By joining [facility]’s FollowMyHealth portal, you will also be able to view your health information using other applications (apps) compatible with our system.

## 2023-05-29 NOTE — PROGRESS NOTE ADULT - PROVIDER SPECIALTY LIST ADULT
Internal Medicine
Internal Medicine
Pulmonology
Pulmonology
Infectious Disease
Internal Medicine

## 2023-05-29 NOTE — PROGRESS NOTE ADULT - NUTRITIONAL ASSESSMENT
This patient has been assessed with a concern for Malnutrition and has been determined to have a diagnosis/diagnoses of Severe protein-calorie malnutrition.    This patient is being managed with:   Diet Regular-  Entered: May 26 2023  5:52PM  

## 2023-05-29 NOTE — PROGRESS NOTE ADULT - SUBJECTIVE AND OBJECTIVE BOX
Pt seen and examined   no complaints  feels much better  less cough    REVIEW OF SYSTEMS:  Constitutional: No fever, e  Cardiovascular: No chest pain, palpitations, dizziness  Resp: no sob  Gastrointestinal: No abdominal or epigastric pain. No nausea, vomiting ; No diarrhea    Skin: No itching, burning, rashes or lesions       MEDICATIONS:  MEDICATIONS  (STANDING):  baclofen 20 milliGRAM(s) Oral <User Schedule>  Biotin 10,000 mcg tablet 1 Tablet(s) 1 Capsule(s) Oral daily  cefTRIAXone   IVPB 1000 milliGRAM(s) IV Intermittent every 24 hours  dalfampridine ER 10 milliGRAM(s) Oral every 12 hours  ergocalciferol 79417 Unit(s) Oral <User Schedule>  fluticasone propionate 50 MICROgram(s)/spray Nasal Spray 1 Spray(s) Both Nostrils two times a day    MEDICATIONS  (PRN):  acetaminophen     Tablet .. 650 milliGRAM(s) Oral every 6 hours PRN Temp greater or equal to 38C (100.4F), Mild Pain (1 - 3)  melatonin 3 milliGRAM(s) Oral at bedtime PRN Insomnia  modafinil 100 milliGRAM(s) Oral daily PRN for anxiety      Allergies    No Known Allergies    Intolerances        Vital Signs Last 24 Hrs  T(C): 37 (29 May 2023 07:20), Max: 37 (29 May 2023 07:20)  T(F): 98.6 (29 May 2023 07:20), Max: 98.6 (29 May 2023 07:20)  HR: 79 (29 May 2023 07:20) (79 - 87)  BP: 92/56 (29 May 2023 07:20) (90/54 - 111/66)  BP(mean): --  RR: 19 (29 May 2023 07:20) (17 - 19)  SpO2: 98% (29 May 2023 07:20) (96% - 98%)    Parameters below as of 29 May 2023 07:20  Patient On (Oxygen Delivery Method): room air          PHYSICAL EXAM:    General: in no acute distress  HEENT: MMM, conjunctiva and sclera clear  Lungs: clear  bilatyerallly  Heart: regular  Gastrointestinal: Soft non-tender non-distended; Normal bowel sounds;   Skin: Warm and dry. No obvious rash  Ext: no edema, no clubbing, no cyanosis  LABS:      CBC Full  -  ( 29 May 2023 05:30 )  WBC Count : 11.05 K/uL  RBC Count : 4.54 M/uL  Hemoglobin : 11.5 g/dL  Hematocrit : 37.2 %  Platelet Count - Automated : 527 K/uL  Mean Cell Volume : 81.9 fl  Mean Cell Hemoglobin : 25.3 pg  Mean Cell Hemoglobin Concentration : 30.9 gm/dL  Auto Neutrophil # : 8.38 K/uL  Auto Lymphocyte # : 1.46 K/uL  Auto Monocyte # : 0.69 K/uL  Auto Eosinophil # : 0.32 K/uL  Auto Basophil # : 0.04 K/uL  Auto Neutrophil % : 75.9 %  Auto Lymphocyte % : 13.2 %  Auto Monocyte % : 6.2 %  Auto Eosinophil % : 2.9 %  Auto Basophil % : 0.4 %    05-29    139  |  101  |  8   ----------------------------<  94  4.1   |  28  |  0.71    Ca    8.7      29 May 2023 05:30  Phos  4.2     05-28  Mg     2.2     05-28    TPro  5.7<L>  /  Alb  3.3  /  TBili  <0.2  /  DBili  x   /  AST  18  /  ALT  43  /  AlkPhos  124<H>  05-28                      RADIOLOGY & ADDITIONAL STUDIES (The following images were personally reviewed):

## 2023-05-29 NOTE — DISCHARGE NOTE PROVIDER - CARE PROVIDER_API CALL
Geraldine Kirk  Infectious Disease  [address], [address]  [place] [address]  Phone: [phone]  Fax: [phone]  Follow Up Time: 1 week    Ezra Quan  Gastroenterology  [address], [place] [address]  Phone: [phone]  Fax: [phone]  Follow Up Time: 2 weeks   Geraldine Kirk  Infectious Disease  [address], [address]  [place] [address]  Phone: [phone]  Fax: [phone]  Follow Up Time: 1 week    Ezra Quan  Gastroenterology  [address]  [place] [address]  Phone: [phone]  Fax: [phone]  Follow Up Time: 2 weeks    Richie Fonseca  Pulmonary Disease  [facility] [place] [address], [address]  [place] [address]  Phone: [phone]  Fax: [phone]  Follow Up Time: 1 week

## 2023-05-29 NOTE — DISCHARGE NOTE PROVIDER - NSDCFUADDAPPT_GEN_ALL_CORE_FT
Please call the office of Dr Fonseca (pulmonologist) to set up a follow up appointment in 1-2 weeks.

## 2023-05-29 NOTE — DISCHARGE NOTE PROVIDER - NSDCCPCAREPLAN_GEN_ALL_CORE_FT
PRINCIPAL DISCHARGE DIAGNOSIS  Diagnosis: Multifocal pneumonia  Assessment and Plan of Treatment: You were diagnosed with pneumonia, an infection in your lungs. You were treated with antibiotics and began to show improvement. Please continue taking your antibiotic *** until ***. Please follow up with your primary care physician to check for full resolution of this problem.     PRINCIPAL DISCHARGE DIAGNOSIS  Diagnosis: Multifocal pneumonia  Assessment and Plan of Treatment: You were diagnosed with pneumonia, an infection in your lungs. You were treated with antibiotics and began to show improvement. Please continue taking your antibiotic Cefpodoxime 200mg ever 12 hours for 7 days from 5/30 until 7/6 . Please follow up with your primary care physician to check for full resolution of this problem.

## 2023-05-30 RX ORDER — CEFPODOXIME PROXETIL 100 MG
1 TABLET ORAL
Qty: 14 | Refills: 0
Start: 2023-05-30 | End: 2023-06-05

## 2023-05-31 LAB
CULTURE RESULTS: SIGNIFICANT CHANGE UP
CULTURE RESULTS: SIGNIFICANT CHANGE UP
SPECIMEN SOURCE: SIGNIFICANT CHANGE UP
SPECIMEN SOURCE: SIGNIFICANT CHANGE UP

## 2023-06-01 DIAGNOSIS — J32.9 CHRONIC SINUSITIS, UNSPECIFIED: ICD-10-CM

## 2023-06-01 DIAGNOSIS — A41.9 SEPSIS, UNSPECIFIED ORGANISM: ICD-10-CM

## 2023-06-01 DIAGNOSIS — R05.9 COUGH, UNSPECIFIED: ICD-10-CM

## 2023-06-01 DIAGNOSIS — G35 MULTIPLE SCLEROSIS: ICD-10-CM

## 2023-06-01 DIAGNOSIS — E43 UNSPECIFIED SEVERE PROTEIN-CALORIE MALNUTRITION: ICD-10-CM

## 2023-06-01 DIAGNOSIS — J15.7 PNEUMONIA DUE TO MYCOPLASMA PNEUMONIAE: ICD-10-CM

## 2023-06-23 ENCOUNTER — OUTPATIENT (OUTPATIENT)
Dept: OUTPATIENT SERVICES | Facility: HOSPITAL | Age: 33
LOS: 1 days | End: 2023-06-23
Payer: COMMERCIAL

## 2023-06-23 PROBLEM — G35 MULTIPLE SCLEROSIS: Chronic | Status: ACTIVE | Noted: 2023-05-26

## 2023-06-23 PROCEDURE — 71046 X-RAY EXAM CHEST 2 VIEWS: CPT | Mod: 26

## 2023-06-23 PROCEDURE — 71046 X-RAY EXAM CHEST 2 VIEWS: CPT

## 2023-07-12 LAB
CULTURE RESULTS: SIGNIFICANT CHANGE UP
SPECIMEN SOURCE: SIGNIFICANT CHANGE UP

## 2023-11-01 ENCOUNTER — OUTPATIENT (OUTPATIENT)
Dept: OUTPATIENT SERVICES | Facility: HOSPITAL | Age: 33
LOS: 1 days | End: 2023-11-01
Payer: COMMERCIAL

## 2023-11-01 PROCEDURE — 71046 X-RAY EXAM CHEST 2 VIEWS: CPT | Mod: 26

## 2023-11-01 PROCEDURE — 71046 X-RAY EXAM CHEST 2 VIEWS: CPT

## 2024-02-16 ENCOUNTER — OUTPATIENT (OUTPATIENT)
Dept: OUTPATIENT SERVICES | Facility: HOSPITAL | Age: 34
LOS: 1 days | End: 2024-02-16
Payer: COMMERCIAL

## 2024-02-16 PROCEDURE — 71046 X-RAY EXAM CHEST 2 VIEWS: CPT | Mod: 26

## 2024-02-16 PROCEDURE — 71046 X-RAY EXAM CHEST 2 VIEWS: CPT

## 2024-03-22 PROBLEM — Z00.00 ENCOUNTER FOR PREVENTIVE HEALTH EXAMINATION: Status: ACTIVE | Noted: 2024-03-22

## 2024-03-25 ENCOUNTER — OUTPATIENT (OUTPATIENT)
Dept: OUTPATIENT SERVICES | Facility: HOSPITAL | Age: 34
LOS: 1 days | End: 2024-03-25
Payer: COMMERCIAL

## 2024-03-25 ENCOUNTER — APPOINTMENT (OUTPATIENT)
Dept: CT IMAGING | Facility: HOSPITAL | Age: 34
End: 2024-03-25

## 2024-03-25 PROCEDURE — 71250 CT THORAX DX C-: CPT

## 2024-03-25 PROCEDURE — 71250 CT THORAX DX C-: CPT | Mod: 26

## 2025-02-04 NOTE — ED ADULT TRIAGE NOTE - SPO2 (%)
As per patient request, refill prescription for Lasix 20 mg daily as needed for leg edema.  Patient mentions he takes 20 to 40 mg as needed   94

## 2025-04-09 NOTE — ED ADULT NURSE NOTE - CAS EDN DISCHARGE ASSESSMENT
Please see the attached refill request.   Alert and oriented to person, place and time/Symptoms improved